# Patient Record
Sex: FEMALE | Race: WHITE | HISPANIC OR LATINO | Employment: UNEMPLOYED | URBAN - METROPOLITAN AREA
[De-identification: names, ages, dates, MRNs, and addresses within clinical notes are randomized per-mention and may not be internally consistent; named-entity substitution may affect disease eponyms.]

---

## 2017-03-20 ENCOUNTER — TRANSCRIBE ORDERS (OUTPATIENT)
Dept: ADMINISTRATIVE | Facility: HOSPITAL | Age: 52
End: 2017-03-20

## 2017-03-20 DIAGNOSIS — R19.00 PELVIC MASS IN FEMALE: Primary | ICD-10-CM

## 2017-03-28 ENCOUNTER — HOSPITAL ENCOUNTER (OUTPATIENT)
Dept: MRI IMAGING | Facility: CLINIC | Age: 52
Discharge: HOME | End: 2017-03-28
Payer: COMMERCIAL

## 2017-03-28 DIAGNOSIS — R19.00 PELVIC MASS IN FEMALE: ICD-10-CM

## 2017-04-06 ENCOUNTER — HOSPITAL ENCOUNTER (OUTPATIENT)
Dept: MRI IMAGING | Facility: HOSPITAL | Age: 52
Discharge: HOME | End: 2017-04-06
Payer: COMMERCIAL

## 2017-04-06 PROCEDURE — 72197 MRI PELVIS W/O & W/DYE: CPT

## 2017-04-06 PROCEDURE — A9585 GADOBUTROL INJECTION: HCPCS | Performed by: RADIOLOGY

## 2017-04-06 RX ADMIN — GADOBUTROL 12 ML: 604.72 INJECTION INTRAVENOUS at 10:49

## 2018-05-09 ENCOUNTER — TRANSCRIBE ORDERS (OUTPATIENT)
Dept: ADMINISTRATIVE | Facility: HOSPITAL | Age: 53
End: 2018-05-09

## 2018-05-09 DIAGNOSIS — Z78.0 MENOPAUSE: ICD-10-CM

## 2018-05-09 DIAGNOSIS — Z13.820 SCREENING FOR OSTEOPOROSIS: Primary | ICD-10-CM

## 2018-05-09 DIAGNOSIS — R92.8 ABNORMAL MAMMOGRAM: Primary | ICD-10-CM

## 2018-05-09 DIAGNOSIS — D25.9 UTERINE LEIOMYOMA, UNSPECIFIED LOCATION: Primary | ICD-10-CM

## 2018-05-09 DIAGNOSIS — R92.2 BREAST DENSITY: Primary | ICD-10-CM

## 2018-05-15 ENCOUNTER — HOSPITAL ENCOUNTER (OUTPATIENT)
Dept: BONE DENSITY | Facility: CLINIC | Age: 53
Discharge: HOME | End: 2018-05-15
Payer: COMMERCIAL

## 2018-05-15 DIAGNOSIS — Z78.0 MENOPAUSE: ICD-10-CM

## 2018-05-15 DIAGNOSIS — Z13.820 SCREENING FOR OSTEOPOROSIS: ICD-10-CM

## 2018-05-15 PROCEDURE — 77080 DXA BONE DENSITY AXIAL: CPT

## 2018-05-16 ENCOUNTER — HOSPITAL ENCOUNTER (OUTPATIENT)
Dept: MAMMOGRAPHY | Facility: CLINIC | Age: 53
Discharge: HOME | End: 2018-05-16
Payer: COMMERCIAL

## 2018-05-16 ENCOUNTER — HOSPITAL ENCOUNTER (OUTPATIENT)
Dept: ULTRASOUND IMAGING | Facility: HOSPITAL | Age: 53
Discharge: HOME | End: 2018-05-16
Payer: COMMERCIAL

## 2018-05-16 ENCOUNTER — HOSPITAL ENCOUNTER (OUTPATIENT)
Dept: ULTRASOUND IMAGING | Facility: CLINIC | Age: 53
Discharge: HOME | End: 2018-05-16
Payer: COMMERCIAL

## 2018-05-16 DIAGNOSIS — R92.8 ABNORMAL MAMMOGRAM: ICD-10-CM

## 2018-05-16 DIAGNOSIS — R92.2 BREAST DENSITY: ICD-10-CM

## 2018-05-16 DIAGNOSIS — D25.9 UTERINE LEIOMYOMA, UNSPECIFIED LOCATION: ICD-10-CM

## 2018-05-16 PROCEDURE — 76641 ULTRASOUND BREAST COMPLETE: CPT

## 2018-05-16 PROCEDURE — 76856 US EXAM PELVIC COMPLETE: CPT

## 2018-05-16 PROCEDURE — 76830 TRANSVAGINAL US NON-OB: CPT

## 2018-05-16 PROCEDURE — 77067 SCR MAMMO BI INCL CAD: CPT

## 2018-05-16 PROCEDURE — 76377 3D RENDER W/INTRP POSTPROCES: CPT

## 2018-08-07 ENCOUNTER — TRANSCRIBE ORDERS (OUTPATIENT)
Dept: ADMINISTRATIVE | Facility: HOSPITAL | Age: 53
End: 2018-08-07

## 2018-09-18 ENCOUNTER — TELEPHONE (OUTPATIENT)
Dept: BARIATRICS | Facility: CLINIC | Age: 53
End: 2018-09-18

## 2018-10-03 ENCOUNTER — OFFICE VISIT (OUTPATIENT)
Dept: BARIATRICS | Facility: CLINIC | Age: 53
End: 2018-10-03
Payer: COMMERCIAL

## 2018-10-03 VITALS
SYSTOLIC BLOOD PRESSURE: 132 MMHG | BODY MASS INDEX: 58.04 KG/M2 | HEIGHT: 57 IN | DIASTOLIC BLOOD PRESSURE: 80 MMHG | WEIGHT: 269 LBS | HEART RATE: 74 BPM | TEMPERATURE: 98.4 F | RESPIRATION RATE: 18 BRPM

## 2018-10-03 DIAGNOSIS — E66.01 MORBID OBESITY WITH BMI OF 50.0-59.9, ADULT (HCC): Primary | ICD-10-CM

## 2018-10-03 DIAGNOSIS — E55.9 VITAMIN D DEFICIENCY: ICD-10-CM

## 2018-10-03 DIAGNOSIS — I10 HYPERTENSION, UNSPECIFIED TYPE: ICD-10-CM

## 2018-10-03 PROBLEM — E66.9 OBESITY: Status: ACTIVE | Noted: 2018-10-03

## 2018-10-03 PROCEDURE — 99204 OFFICE O/P NEW MOD 45 MIN: CPT | Performed by: PHYSICIAN ASSISTANT

## 2018-10-03 RX ORDER — LISINOPRIL 10 MG/1
TABLET ORAL
COMMUNITY
Start: 2017-02-20

## 2018-10-03 RX ORDER — DIPHENOXYLATE HYDROCHLORIDE AND ATROPINE SULFATE 2.5; .025 MG/1; MG/1
1 TABLET ORAL
COMMUNITY

## 2018-10-03 NOTE — PROGRESS NOTES
Assessment/Plan:    Hypertension  Taking lisinopril  -should improve with weight loss, dietary, and lifestyle changes      Morbid obesity with BMI of 50 0-59 9, adult ([facility] )  -Discussed options of HealthyCORE-Intensive Lifestyle Intervention Program, Very Low Calorie Diet-VLCD, Conservative Program, Zuhair-En-Y Gastric Bypass and Vertical Sleeve Gastrectomy and the role of weight loss medications   -Initial weight loss goal of 5-10% weight loss for improved health  -Screening labs  Recommend checking lab coverage before having labs drawn  -A1c, CMP, Lipid, TSH and Vit D reviewed from 2/22/2018 all within acceptable limits except for elevated A1C and decreased Vitamin D--will repeat vit D  - STOP BANG-3/8  -Patient is interested in pursuing surgery seminar and if she decides against surgery will do Very Low Calorie Diet-VLCD     Monitor blood pressure and if at any point BP goes below 100/60 patyient to call us to alter BP meds or is she experiences lightheaded or dizziness  Follow up for vlcd start  Diagnoses and all orders for this visit:    Morbid obesity with BMI of 50 0-59 9, adult ([facility] )  -     Vitamin D 25 hydroxy; Future  -     Comprehensive metabolic panel; Future    Vitamin D deficiency  -     Vitamin D 25 hydroxy; Future    Hypertension, unspecified type  -     Comprehensive metabolic panel; Future    Other orders  -     lisinopril (ZESTRIL) 10 mg tablet; 1 tab(s)  -     Garlic 10 MG CAPS;   -     multivitamin (THERAGRAN) TABS; Take 1 tablet by mouth  -     Calcium-Magnesium 300-300 MG TABS; Take by mouth  -     Cyanocobalamin (B-12 PO); Take by mouth          Subjective:   Chief Complaint   Patient presents with    Consult     Pt here for MWM consult  BMI 53 7       Patient ID: Mateo Looney  is a 46 y o  female with excess weight/obesity here to pursue weight management      Past Medical History:   Diagnosis Date    Hypertension     No official diagnosis    Obesity HPI:  Obesity/Excess Weight:  Severity: Very Severe  Onset: For the last 27 years   Modifiers: Diet and Exercise  Contributing factors: Insufficient Physical Activity and portion sizes   Associated symptoms: comorbid conditions    Goals:175 lbs   Hydration:3-4 bottles of water per day  1 cup of hot tea with hibisco   Alcohol: rare    The following portions of the patient's history were reviewed and updated as appropriate: allergies, current medications, past family history, past medical history, past social history, past surgical history and problem list     Review of Systems   HENT: Negative for sore throat  Respiratory: Negative for cough and shortness of breath  Cardiovascular: Negative for chest pain and palpitations  Gastrointestinal: Negative for abdominal pain, constipation, diarrhea, nausea and vomiting  Denies GERD   Endocrine: Negative for cold intolerance and heat intolerance  Genitourinary: Negative for dysuria  Musculoskeletal: Negative for arthralgias and back pain  Skin: Negative for rash  Neurological: Negative for headaches  Psychiatric/Behavioral: Negative for suicidal ideas (denies HI)  Denies depression or anxiety       Objective:    /80 (BP Location: Right arm, Patient Position: Sitting, Cuff Size: Large)   Pulse 74   Temp 98 4 °F (36 9 °C) (Tympanic)   Resp 18   Ht 4' 9" (1 448 m)   Wt 122 kg (269 lb)   BMI 58 21 kg/m²     Physical Exam   Nursing note and vitals reviewed  Constitutional   General appearance: Abnormal   well developed and morbidly obese  Eyes No conjunctival pallor  Ears, Nose, Mouth, and Throat Oral mucosa moist    Pulmonary   Respiratory effort: No increased work of breathing or signs of respiratory distress  Auscultation of lungs: Clear to auscultation, equal breath sounds bilaterally, no wheezes, no rales, no rhonci  Cardiovascular   Auscultation of heart: Normal rate and rhythm, normal S1 and S2, without murmurs  Examination of extremities for edema and/or varicosities: +1 pitting edema bilaterally   Abdomen   Abdomen: Abnormal   The abdomen was obese  Bowel sounds were normal  The abdomen was soft and nontender     Musculoskeletal   Gait and station: Normal     Psychiatric   Orientation to person, place and time: Normal     Affect: appropriate

## 2018-10-03 NOTE — ASSESSMENT & PLAN NOTE
-Discussed options of HealthyCORE-Intensive Lifestyle Intervention Program, Very Low Calorie Diet-VLCD, Conservative Program, Zuhair-En-Y Gastric Bypass and Vertical Sleeve Gastrectomy and the role of weight loss medications   -Initial weight loss goal of 5-10% weight loss for improved health  -Screening labs  Recommend checking lab coverage before having labs drawn  -A1c, CMP, Lipid, TSH and Vit D reviewed from 2/22/2018 all within acceptable limits except for elevated A1C and decreased Vitamin D--will repeat vit D  - STOP BANG-3/8  -Patient is interested in pursuing surgery seminar and if she decides against surgery will do Very Low Calorie Diet-VLCD     Monitor blood pressure and if at any point BP goes below 100/60 patyient to call us to alter BP meds or is she experiences lightheaded or dizziness

## 2022-11-08 ENCOUNTER — OFFICE VISIT (OUTPATIENT)
Dept: OBGYN CLINIC | Facility: CLINIC | Age: 57
End: 2022-11-08

## 2022-11-08 VITALS
WEIGHT: 269.2 LBS | BODY MASS INDEX: 56.51 KG/M2 | DIASTOLIC BLOOD PRESSURE: 88 MMHG | SYSTOLIC BLOOD PRESSURE: 136 MMHG | HEIGHT: 58 IN

## 2022-11-08 DIAGNOSIS — Z12.11 ENCOUNTER FOR SCREENING COLONOSCOPY: ICD-10-CM

## 2022-11-08 DIAGNOSIS — R92.2 DENSE BREAST TISSUE: ICD-10-CM

## 2022-11-08 DIAGNOSIS — Z12.4 CERVICAL CANCER SCREENING: ICD-10-CM

## 2022-11-08 DIAGNOSIS — Z12.31 ENCOUNTER FOR SCREENING MAMMOGRAM FOR BREAST CANCER: ICD-10-CM

## 2022-11-08 DIAGNOSIS — Z01.419 ENCOUNTER FOR ANNUAL ROUTINE GYNECOLOGICAL EXAMINATION: Primary | ICD-10-CM

## 2022-11-08 PROBLEM — D12.6 TUBULAR ADENOMA OF COLON: Status: ACTIVE | Noted: 2017-11-28

## 2022-11-08 PROBLEM — E27.40 ADRENAL INSUFFICIENCY (HCC): Status: ACTIVE | Noted: 2018-02-15

## 2022-11-08 PROBLEM — E66.01 MORBIDLY OBESE (HCC): Status: ACTIVE | Noted: 2018-02-15

## 2022-11-08 PROBLEM — E78.2 MIXED HYPERLIPIDEMIA: Status: ACTIVE | Noted: 2017-11-28

## 2022-11-08 PROBLEM — I10 HTN (HYPERTENSION), BENIGN: Status: ACTIVE | Noted: 2017-11-28

## 2022-11-08 RX ORDER — FLUTICASONE PROPIONATE 50 MCG
1 SPRAY, SUSPENSION (ML) NASAL 2 TIMES DAILY
COMMUNITY
Start: 2022-11-03

## 2022-11-08 RX ORDER — LORATADINE 10 MG/1
10 TABLET ORAL DAILY
COMMUNITY
Start: 2022-11-03

## 2022-11-08 RX ORDER — NEOMYCIN SULFATE, POLYMYXIN B SULFATE AND DEXAMETHASONE 3.5; 10000; 1 MG/ML; [USP'U]/ML; MG/ML
SUSPENSION/ DROPS OPHTHALMIC
COMMUNITY
Start: 2022-11-03

## 2022-11-08 NOTE — ASSESSMENT & PLAN NOTE
Patient declines mammogram screening  Request ultrasound only  Reviewed recommendations for screening for breast cancer, ultrasound is not adequate alone  Pap/HPV collected    Encourage healthy diet, exercise, Calcium 1200mg per day and at least 800 iu Vitamin D daily

## 2022-11-08 NOTE — PATIENT INSTRUCTIONS

## 2022-11-08 NOTE — PROGRESS NOTES
Assessment/Plan:    Encounter for annual routine gynecological examination  Patient declines mammogram screening  Request ultrasound only  Reviewed recommendations for screening for breast cancer, ultrasound is not adequate alone  Pap/HPV collected    Encourage healthy diet, exercise, Calcium 1200mg per day and at least 800 iu Vitamin D daily  Diagnoses and all orders for this visit:    Encounter for annual routine gynecological examination    Cervical cancer screening  -     Liquid-based pap, screening    Encounter for screening mammogram for breast cancer  -     Mammo screening bilateral w 3d & cad; Future  -     US breast screening bilateral complete (ABUS); Future    Encounter for screening colonoscopy  -     Ambulatory Referral to Gastroenterology; Future    Dense breast tissue  -     US breast screening bilateral complete (ABUS); Future    Other orders  -     betamethasone valerate (VALISONE) 0 1 % cream; APPLY IN LEFT EAR TWICE A DAY AS DIRECTED if needed  -     fluticasone (FLONASE) 50 mcg/act nasal spray; 1 spray 2 (two) times a day  -     loratadine (CLARITIN) 10 mg tablet; Take 10 mg by mouth daily  -     neomycin-polymyxin-dexamethasone (MAXITROL) ophthalmic suspension; instill 5 drops IN LEFT EAR TWICE A DAY          Subjective:      Patient ID: Ericka Juarez is a 61 y o  female  Patient presents for a routine annual visit  Menarche- 12Y/O  Last Pap Smear- no record? Due today    Mammogram- 8/12/20  Overdue  Order placed   Colonoscopy-Per pt had about 6 years ago  Recall was 5 years  Referral placed  [id number]  Non smoker  Social drinker  Currently not sexually active  No family history of uterine, ovarian, cervical or breast cancer  No concerns/questions for today's visit  States has not had GYN care since 2018  Declines mammogram   Feels ultrasound is more accurate and more comfortable  NO bleeding since age 54ish  No hot flashes  Recent acne             The following portions of the patient's history were reviewed and updated as appropriate:   She  has a past medical history of Hypertension and Obesity  She   Patient Active Problem List    Diagnosis Date Noted   • Encounter for annual routine gynecological examination 11/08/2022   • Morbid obesity with BMI of 50 0-59 9, adult ([facility] ) 10/03/2018   • Adrenal insufficiency ([email or web address] ) 02/15/2018   • Morbidly obese ([email or web address] ) 02/15/2018   • HTN (hypertension), benign 11/28/2017   • Mixed hyperlipidemia 11/28/2017   • Tubular adenoma of colon 11/28/2017     She  has a past surgical history that includes No past surgeries  Her family history includes Diabetes in her mother; Hypertension in her father; Stroke in her father  She  reports that she has never smoked  She has never used smokeless tobacco  She reports current alcohol use  She reports that she does not use drugs  Current Outpatient Medications   Medication Sig Dispense Refill   • betamethasone valerate (VALISONE) 0 1 % cream APPLY IN LEFT EAR TWICE A DAY AS DIRECTED if needed     • Calcium-Magnesium 300-300 MG TABS Take by mouth     • Cyanocobalamin (B-12 PO) Take by mouth     • fluticasone (FLONASE) 50 mcg/act nasal spray 1 spray 2 (two) times a day     • Garlic 10 MG CAPS      • lisinopril (ZESTRIL) 10 mg tablet 1 tab(s)     • loratadine (CLARITIN) 10 mg tablet Take 10 mg by mouth daily     • multivitamin (THERAGRAN) TABS Take 1 tablet by mouth     • neomycin-polymyxin-dexamethasone (MAXITROL) ophthalmic suspension instill 5 drops IN LEFT EAR TWICE A DAY       No current facility-administered medications for this visit       Current Outpatient Medications on File Prior to Visit   Medication Sig   • betamethasone valerate (VALISONE) 0 1 % cream APPLY IN LEFT EAR TWICE A DAY AS DIRECTED if needed   • Calcium-Magnesium 300-300 MG TABS Take by mouth   • Cyanocobalamin (B-12 PO) Take by mouth   • fluticasone (FLONASE) 50 mcg/act nasal spray 1 spray 2 (two) times a day   • Garlic 10 MG CAPS    • lisinopril (ZESTRIL) 10 mg tablet 1 tab(s)   • loratadine (CLARITIN) 10 mg tablet Take 10 mg by mouth daily   • multivitamin (THERAGRAN) TABS Take 1 tablet by mouth   • neomycin-polymyxin-dexamethasone (MAXITROL) ophthalmic suspension instill 5 drops IN LEFT EAR TWICE A DAY     No current facility-administered medications on file prior to visit  She has No Known Allergies       Review of Systems   Constitutional: Negative for activity change, appetite change, chills, fatigue and fever  HENT: Negative for rhinorrhea, sneezing and sore throat  Eyes: Negative for visual disturbance  Respiratory: Negative for cough, shortness of breath and wheezing  Cardiovascular: Negative for chest pain, palpitations and leg swelling  Gastrointestinal: Negative for abdominal distention, constipation, diarrhea, nausea and vomiting  Genitourinary: Negative for difficulty urinating  Neurological: Negative for syncope and light-headedness  Objective:      /88 (BP Location: Left arm, Patient Position: Sitting, Cuff Size: Standard)   Ht 4' 10" (1 473 m)   Wt 122 kg (269 lb 3 2 oz)   BMI 56 26 kg/m²          Physical Exam  Constitutional:       General: She is not in acute distress  Appearance: She is well-developed  She is not diaphoretic  Chest:   Breasts: Breasts are symmetrical       Right: No inverted nipple, mass, nipple discharge, skin change or tenderness  Left: No inverted nipple, mass, nipple discharge, skin change or tenderness  Abdominal:      General: Bowel sounds are normal  There is no distension  Palpations: Abdomen is soft  There is no mass  Tenderness: There is no abdominal tenderness  There is no guarding or rebound  Genitourinary:     Labia:         Right: No rash, tenderness, lesion or injury  Left: No rash, tenderness, lesion or injury  Vagina: No vaginal discharge or bleeding        Cervix: No cervical motion tenderness, discharge or friability  Uterus: Not deviated, not enlarged, not fixed and not tender  Adnexa:         Right: No mass, tenderness or fullness  Left: No mass, tenderness or fullness  Comments: Urethral meatus- no lesions, non tender  Urethra non tender  Bladder non tender  Thin, atrophic vaginal mucosa  Skin:     General: Skin is warm and dry  Coloration: Skin is not pale  Findings: No erythema or rash

## 2022-11-10 LAB
HPV HR 12 DNA CVX QL NAA+PROBE: NEGATIVE
HPV16 DNA CVX QL NAA+PROBE: NEGATIVE
HPV18 DNA CVX QL NAA+PROBE: NEGATIVE

## 2022-11-12 LAB
LAB AP GYN PRIMARY INTERPRETATION: NORMAL
Lab: NORMAL

## 2022-11-16 ENCOUNTER — TELEPHONE (OUTPATIENT)
Dept: OBGYN CLINIC | Facility: CLINIC | Age: 57
End: 2022-11-16

## 2022-11-16 NOTE — TELEPHONE ENCOUNTER
----- Message from Priya Robles MD sent at 11/14/2022 12:59 PM EST -----  Please call Cecilia Coleman  Pap and HPV reviewed   Normal

## 2022-11-16 NOTE — TELEPHONE ENCOUNTER
Left detailed message on VM (OK per comm consent)  Office number provided with questions or concerns

## 2023-01-11 ENCOUNTER — PREP FOR PROCEDURE (OUTPATIENT)
Dept: GASTROENTEROLOGY | Facility: CLINIC | Age: 58
End: 2023-01-11

## 2023-01-11 ENCOUNTER — TELEPHONE (OUTPATIENT)
Dept: GASTROENTEROLOGY | Facility: CLINIC | Age: 58
End: 2023-01-11

## 2023-01-11 DIAGNOSIS — Z86.010 HISTORY OF COLON POLYPS: Primary | ICD-10-CM

## 2023-01-11 NOTE — TELEPHONE ENCOUNTER
Scheduled date of colonoscopy (as of today): 3/30/2023  Physician performing colonoscopy: Dr Herbie Hicks  Location of colonoscopy: [place]    Clearances: N/A

## 2023-01-12 ENCOUNTER — TELEPHONE (OUTPATIENT)
Dept: GASTROENTEROLOGY | Facility: CLINIC | Age: 58
End: 2023-01-12

## 2023-03-30 ENCOUNTER — HOSPITAL ENCOUNTER (OUTPATIENT)
Dept: GASTROENTEROLOGY | Facility: HOSPITAL | Age: 58
Setting detail: OUTPATIENT SURGERY
Discharge: HOME | End: 2023-03-30
Attending: INTERNAL MEDICINE

## 2023-03-30 ENCOUNTER — ANESTHESIA EVENT (OUTPATIENT)
Dept: GASTROENTEROLOGY | Facility: HOSPITAL | Age: 58
End: 2023-03-30

## 2023-03-30 ENCOUNTER — ANESTHESIA (OUTPATIENT)
Dept: GASTROENTEROLOGY | Facility: HOSPITAL | Age: 58
End: 2023-03-30

## 2023-03-30 VITALS
WEIGHT: 272.71 LBS | HEIGHT: 58 IN | OXYGEN SATURATION: 95 % | BODY MASS INDEX: 57.24 KG/M2 | TEMPERATURE: 97.4 F | RESPIRATION RATE: 16 BRPM | HEART RATE: 69 BPM | DIASTOLIC BLOOD PRESSURE: 65 MMHG | SYSTOLIC BLOOD PRESSURE: 113 MMHG

## 2023-03-30 DIAGNOSIS — Z86.010 HISTORY OF COLON POLYPS: ICD-10-CM

## 2023-03-30 RX ORDER — PROPOFOL 10 MG/ML
INJECTION, EMULSION INTRAVENOUS AS NEEDED
Status: DISCONTINUED | OUTPATIENT
Start: 2023-03-30 | End: 2023-03-30

## 2023-03-30 RX ORDER — SODIUM CHLORIDE, SODIUM LACTATE, POTASSIUM CHLORIDE, CALCIUM CHLORIDE 600; 310; 30; 20 MG/100ML; MG/100ML; MG/100ML; MG/100ML
INJECTION, SOLUTION INTRAVENOUS CONTINUOUS PRN
Status: DISCONTINUED | OUTPATIENT
Start: 2023-03-30 | End: 2023-03-30

## 2023-03-30 RX ORDER — LIDOCAINE HYDROCHLORIDE 20 MG/ML
INJECTION, SOLUTION EPIDURAL; INFILTRATION; INTRACAUDAL; PERINEURAL AS NEEDED
Status: DISCONTINUED | OUTPATIENT
Start: 2023-03-30 | End: 2023-03-30

## 2023-03-30 RX ADMIN — SODIUM CHLORIDE, SODIUM LACTATE, POTASSIUM CHLORIDE, AND CALCIUM CHLORIDE: .6; .31; .03; .02 INJECTION, SOLUTION INTRAVENOUS at 09:25

## 2023-03-30 RX ADMIN — PROPOFOL 20 MG: 10 INJECTION, EMULSION INTRAVENOUS at 09:40

## 2023-03-30 RX ADMIN — LIDOCAINE HYDROCHLORIDE 100 MG: 20 INJECTION, SOLUTION EPIDURAL; INFILTRATION; INTRACAUDAL at 09:34

## 2023-03-30 RX ADMIN — PROPOFOL 20 MG: 10 INJECTION, EMULSION INTRAVENOUS at 09:38

## 2023-03-30 RX ADMIN — PROPOFOL 130 MG: 10 INJECTION, EMULSION INTRAVENOUS at 09:34

## 2023-03-30 RX ADMIN — PROPOFOL 20 MG: 10 INJECTION, EMULSION INTRAVENOUS at 09:42

## 2023-03-30 RX ADMIN — PROPOFOL 30 MG: 10 INJECTION, EMULSION INTRAVENOUS at 09:36

## 2023-03-30 NOTE — ANESTHESIA PREPROCEDURE EVALUATION
Procedure:  COLONOSCOPY    Relevant Problems   CARDIO   (+) HTN (hypertension), benign   (+) Mixed hyperlipidemia      Endocrine   (+) Adrenal insufficiency (HCC)      Other   (+) Morbid obesity with BMI of 50 0-59 9, adult (HCC)   (+) Morbidly obese (HCC)        Physical Exam    Airway    Mallampati score: III  TM Distance: >3 FB  Neck ROM: full     Dental       Cardiovascular  Cardiovascular exam normal    Pulmonary  Pulmonary exam normal     Other Findings        Anesthesia Plan  ASA Score- 3     Anesthesia Type- IV sedation with anesthesia with ASA Monitors  Additional Monitors:   Airway Plan:           Plan Factors-Exercise tolerance (METS): >4 METS  Chart reviewed  EKG reviewed  Imaging results reviewed  Existing labs reviewed  Patient summary reviewed  Induction- intravenous  Postoperative Plan-     Informed Consent- Anesthetic plan and risks discussed with patient  I personally reviewed this patient with the CRNA  Discussed and agreed on the Anesthesia Plan with the CRNA  Priti Bustamante

## 2023-03-30 NOTE — ANESTHESIA POSTPROCEDURE EVALUATION
Post-Op Assessment Note    CV Status:  Stable  Pain Score: 0    Pain management: adequate     Mental Status:  Alert and awake   Hydration Status:  Euvolemic   PONV Controlled:  Controlled   Airway Patency:  Patent      Post Op Vitals Reviewed: Yes      Staff: CRNA         There were no known notable events for this encounter      /68 (03/30/23 0950)    Temp (!) 97 4 °F (36 3 °C) (03/30/23 0950)    Pulse 76 (03/30/23 0950)   Resp 16 (03/30/23 0950)    SpO2 94 % (03/30/23 0950)

## 2023-03-30 NOTE — H&P
"History and Physical -  [facility]  Terry Harris 60 y o  female MRN: [id number]      HPI: Terry Harris is a 60y o  year old female who presents for history of colon polyp      REVIEW OF SYSTEMS: Per the HPI, and otherwise unremarkable  Historical Information   Past Medical History:   Diagnosis Date   • Hypertension     No official diagnosis   • Obesity      Past Surgical History:   Procedure Laterality Date   • CARPAL TUNNEL RELEASE     • COLONOSCOPY     • CYST REMOVAL     • NO PAST SURGERIES       Social History   Social History     Substance and Sexual Activity   Alcohol Use Yes    Comment: occasional     Social History     Substance and Sexual Activity   Drug Use No     Social History     Tobacco Use   Smoking Status Never   Smokeless Tobacco Never     Family History   Problem Relation Age of Onset   • Diabetes Mother    • Stroke Father    • Hypertension Father    • Cancer Neg Hx    • Heart disease Neg Hx    • Thyroid disease Neg Hx    • Breast cancer Neg Hx    • Ovarian cancer Neg Hx    • Uterine cancer Neg Hx    • Cervical cancer Neg Hx        Meds/Allergies     (Not in a hospital admission)      No Known Allergies    Objective     Blood pressure (!) 174/79, pulse 87, temperature 97 8 °F (36 6 °C), temperature source Temporal, resp  rate 16, height 4' 10\" (1 473 m), weight 124 kg (272 lb 11 3 oz), SpO2 98 %  PHYSICAL EXAM    Gen: NAD  CV: RRR  CHEST: Clear  ABD: soft, NT/ND  EXT: no edema      ASSESSMENT/PLAN:  This is a 60y o  year old female here for colonoscopy, and she is stable and optimized for her procedure          "

## 2023-10-31 ENCOUNTER — APPOINTMENT (OUTPATIENT)
Dept: MRI IMAGING | Facility: HOSPITAL | Age: 58
End: 2023-10-31
Payer: COMMERCIAL

## 2023-10-31 ENCOUNTER — APPOINTMENT (EMERGENCY)
Dept: RADIOLOGY | Facility: HOSPITAL | Age: 58
End: 2023-10-31
Payer: COMMERCIAL

## 2023-10-31 ENCOUNTER — APPOINTMENT (EMERGENCY)
Dept: CT IMAGING | Facility: HOSPITAL | Age: 58
End: 2023-10-31
Payer: COMMERCIAL

## 2023-10-31 ENCOUNTER — HOSPITAL ENCOUNTER (OUTPATIENT)
Facility: HOSPITAL | Age: 58
Setting detail: OBSERVATION
Discharge: HOME | End: 2023-11-01
Attending: EMERGENCY MEDICINE | Admitting: STUDENT IN AN ORGANIZED HEALTH CARE EDUCATION/TRAINING PROGRAM
Payer: COMMERCIAL

## 2023-10-31 DIAGNOSIS — I63.9 CEREBROVASCULAR ACCIDENT (CVA), UNSPECIFIED MECHANISM (HCC): ICD-10-CM

## 2023-10-31 DIAGNOSIS — R20.0 FACIAL NUMBNESS: Primary | ICD-10-CM

## 2023-10-31 PROBLEM — E11.69 TYPE 2 DIABETES MELLITUS WITH OTHER SPECIFIED COMPLICATION (HCC): Status: ACTIVE | Noted: 2023-10-31

## 2023-10-31 PROBLEM — R29.90 STROKE-LIKE SYMPTOMS: Status: ACTIVE | Noted: 2023-10-31

## 2023-10-31 PROBLEM — I16.0 HYPERTENSIVE URGENCY: Status: ACTIVE | Noted: 2017-11-28

## 2023-10-31 PROBLEM — I16.1 HYPERTENSIVE EMERGENCY: Status: ACTIVE | Noted: 2017-11-28

## 2023-10-31 PROBLEM — R79.89 ABNORMAL CORTISOL LEVEL: Status: ACTIVE | Noted: 2018-02-15

## 2023-10-31 PROBLEM — Z91.148 NONADHERENCE TO MEDICATION: Status: ACTIVE | Noted: 2023-10-31

## 2023-10-31 LAB
2HR DELTA HS TROPONIN: 2 NG/L
4HR DELTA HS TROPONIN: 1 NG/L
ANION GAP SERPL CALCULATED.3IONS-SCNC: 7 MMOL/L
APTT PPP: 29 SECONDS (ref 23–37)
ATRIAL RATE: 86 BPM
BUN SERPL-MCNC: 14 MG/DL (ref 5–25)
CALCIUM SERPL-MCNC: 9.1 MG/DL (ref 8.4–10.2)
CARDIAC TROPONIN I PNL SERPL HS: 4 NG/L
CARDIAC TROPONIN I PNL SERPL HS: 5 NG/L
CARDIAC TROPONIN I PNL SERPL HS: 6 NG/L
CHLORIDE SERPL-SCNC: 103 MMOL/L (ref 96–108)
CO2 SERPL-SCNC: 27 MMOL/L (ref 21–32)
CORTIS AM PEAK SERPL-MCNC: 20.4 UG/DL (ref 6.7–22.6)
CREAT SERPL-MCNC: 0.85 MG/DL (ref 0.6–1.3)
ERYTHROCYTE [DISTWIDTH] IN BLOOD BY AUTOMATED COUNT: 13.5 % (ref 11.6–15.1)
FLUAV RNA RESP QL NAA+PROBE: NEGATIVE
FLUBV RNA RESP QL NAA+PROBE: NEGATIVE
GFR SERPL CREATININE-BSD FRML MDRD: 75 ML/MIN/1.73SQ M
GLUCOSE SERPL-MCNC: 103 MG/DL (ref 65–140)
GLUCOSE SERPL-MCNC: 111 MG/DL (ref 65–140)
GLUCOSE SERPL-MCNC: 195 MG/DL (ref 65–140)
GLUCOSE SERPL-MCNC: 206 MG/DL (ref 65–140)
GLUCOSE SERPL-MCNC: 91 MG/DL (ref 65–140)
HCT VFR BLD AUTO: 43.1 % (ref 34.8–46.1)
HGB BLD-MCNC: 14.2 G/DL (ref 11.5–15.4)
INR PPP: 1.01 (ref 0.84–1.19)
MCH RBC QN AUTO: 30.3 PG (ref 26.8–34.3)
MCHC RBC AUTO-ENTMCNC: 32.9 G/DL (ref 31.4–37.4)
MCV RBC AUTO: 92 FL (ref 82–98)
P AXIS: 67 DEGREES
PLATELET # BLD AUTO: 257 THOUSANDS/UL (ref 149–390)
PMV BLD AUTO: 9.8 FL (ref 8.9–12.7)
POTASSIUM SERPL-SCNC: 3.5 MMOL/L (ref 3.5–5.3)
PR INTERVAL: 146 MS
PROTHROMBIN TIME: 13.9 SECONDS (ref 11.6–14.5)
QRS AXIS: 73 DEGREES
QRSD INTERVAL: 82 MS
QT INTERVAL: 364 MS
QTC INTERVAL: 435 MS
RBC # BLD AUTO: 4.69 MILLION/UL (ref 3.81–5.12)
RSV RNA RESP QL NAA+PROBE: NEGATIVE
SARS-COV-2 RNA RESP QL NAA+PROBE: NEGATIVE
SODIUM SERPL-SCNC: 137 MMOL/L (ref 135–147)
T WAVE AXIS: 52 DEGREES
VENTRICULAR RATE: 86 BPM
WBC # BLD AUTO: 7.56 THOUSAND/UL (ref 4.31–10.16)

## 2023-10-31 PROCEDURE — 82533 TOTAL CORTISOL: CPT | Performed by: PHYSICIAN ASSISTANT

## 2023-10-31 PROCEDURE — 85610 PROTHROMBIN TIME: CPT | Performed by: EMERGENCY MEDICINE

## 2023-10-31 PROCEDURE — 82948 REAGENT STRIP/BLOOD GLUCOSE: CPT

## 2023-10-31 PROCEDURE — 70496 CT ANGIOGRAPHY HEAD: CPT

## 2023-10-31 PROCEDURE — 80048 BASIC METABOLIC PNL TOTAL CA: CPT | Performed by: EMERGENCY MEDICINE

## 2023-10-31 PROCEDURE — 36415 COLL VENOUS BLD VENIPUNCTURE: CPT | Performed by: EMERGENCY MEDICINE

## 2023-10-31 PROCEDURE — 70551 MRI BRAIN STEM W/O DYE: CPT

## 2023-10-31 PROCEDURE — 85730 THROMBOPLASTIN TIME PARTIAL: CPT | Performed by: EMERGENCY MEDICINE

## 2023-10-31 PROCEDURE — 84484 ASSAY OF TROPONIN QUANT: CPT | Performed by: EMERGENCY MEDICINE

## 2023-10-31 PROCEDURE — 99285 EMERGENCY DEPT VISIT HI MDM: CPT | Performed by: EMERGENCY MEDICINE

## 2023-10-31 PROCEDURE — 93005 ELECTROCARDIOGRAM TRACING: CPT

## 2023-10-31 PROCEDURE — 0241U HB NFCT DS VIR RESP RNA 4 TRGT: CPT | Performed by: EMERGENCY MEDICINE

## 2023-10-31 PROCEDURE — 99291 CRITICAL CARE FIRST HOUR: CPT | Performed by: PHYSICAL MEDICINE & REHABILITATION

## 2023-10-31 PROCEDURE — 99223 1ST HOSP IP/OBS HIGH 75: CPT | Performed by: STUDENT IN AN ORGANIZED HEALTH CARE EDUCATION/TRAINING PROGRAM

## 2023-10-31 PROCEDURE — 85027 COMPLETE CBC AUTOMATED: CPT | Performed by: EMERGENCY MEDICINE

## 2023-10-31 PROCEDURE — 84484 ASSAY OF TROPONIN QUANT: CPT | Performed by: PHYSICIAN ASSISTANT

## 2023-10-31 PROCEDURE — 93010 ELECTROCARDIOGRAM REPORT: CPT | Performed by: INTERNAL MEDICINE

## 2023-10-31 PROCEDURE — 71045 X-RAY EXAM CHEST 1 VIEW: CPT

## 2023-10-31 PROCEDURE — 99285 EMERGENCY DEPT VISIT HI MDM: CPT

## 2023-10-31 PROCEDURE — 99291 CRITICAL CARE FIRST HOUR: CPT | Performed by: PSYCHIATRY & NEUROLOGY

## 2023-10-31 PROCEDURE — 70498 CT ANGIOGRAPHY NECK: CPT

## 2023-10-31 RX ORDER — MAGNESIUM HYDROXIDE/ALUMINUM HYDROXICE/SIMETHICONE 120; 1200; 1200 MG/30ML; MG/30ML; MG/30ML
30 SUSPENSION ORAL EVERY 6 HOURS PRN
Status: DISCONTINUED | OUTPATIENT
Start: 2023-10-31 | End: 2023-11-01 | Stop reason: HOSPADM

## 2023-10-31 RX ORDER — ASPIRIN 81 MG/1
81 TABLET, CHEWABLE ORAL DAILY
Status: DISCONTINUED | OUTPATIENT
Start: 2023-11-01 | End: 2023-11-01 | Stop reason: HOSPADM

## 2023-10-31 RX ORDER — CLOPIDOGREL BISULFATE 75 MG/1
300 TABLET ORAL ONCE
Status: COMPLETED | OUTPATIENT
Start: 2023-10-31 | End: 2023-10-31

## 2023-10-31 RX ORDER — LORAZEPAM 2 MG/ML
1 INJECTION INTRAMUSCULAR ONCE AS NEEDED
Status: COMPLETED | OUTPATIENT
Start: 2023-10-31 | End: 2023-10-31

## 2023-10-31 RX ORDER — INSULIN LISPRO 100 [IU]/ML
2-12 INJECTION, SOLUTION INTRAVENOUS; SUBCUTANEOUS
Status: DISCONTINUED | OUTPATIENT
Start: 2023-10-31 | End: 2023-11-01 | Stop reason: HOSPADM

## 2023-10-31 RX ORDER — ATORVASTATIN CALCIUM 40 MG/1
40 TABLET, FILM COATED ORAL EVERY EVENING
Status: DISCONTINUED | OUTPATIENT
Start: 2023-10-31 | End: 2023-11-01 | Stop reason: HOSPADM

## 2023-10-31 RX ORDER — POLYETHYLENE GLYCOL 3350 17 G/17G
17 POWDER, FOR SOLUTION ORAL DAILY PRN
Status: DISCONTINUED | OUTPATIENT
Start: 2023-10-31 | End: 2023-11-01 | Stop reason: HOSPADM

## 2023-10-31 RX ORDER — ONDANSETRON 2 MG/ML
4 INJECTION INTRAMUSCULAR; INTRAVENOUS EVERY 6 HOURS PRN
Status: DISCONTINUED | OUTPATIENT
Start: 2023-10-31 | End: 2023-11-01 | Stop reason: HOSPADM

## 2023-10-31 RX ORDER — ENOXAPARIN SODIUM 100 MG/ML
60 INJECTION SUBCUTANEOUS EVERY 12 HOURS SCHEDULED
Status: DISCONTINUED | OUTPATIENT
Start: 2023-10-31 | End: 2023-11-01 | Stop reason: HOSPADM

## 2023-10-31 RX ORDER — ASPIRIN 81 MG/1
324 TABLET, CHEWABLE ORAL ONCE
Status: COMPLETED | OUTPATIENT
Start: 2023-10-31 | End: 2023-10-31

## 2023-10-31 RX ORDER — CLOPIDOGREL BISULFATE 75 MG/1
75 TABLET ORAL DAILY
Status: DISCONTINUED | OUTPATIENT
Start: 2023-11-01 | End: 2023-11-01 | Stop reason: HOSPADM

## 2023-10-31 RX ORDER — ACETAMINOPHEN 325 MG/1
650 TABLET ORAL EVERY 6 HOURS PRN
Status: DISCONTINUED | OUTPATIENT
Start: 2023-10-31 | End: 2023-11-01 | Stop reason: HOSPADM

## 2023-10-31 RX ADMIN — CLOPIDOGREL BISULFATE 300 MG: 75 TABLET ORAL at 09:00

## 2023-10-31 RX ADMIN — ASPIRIN 324 MG: 81 TABLET, CHEWABLE ORAL at 09:00

## 2023-10-31 RX ADMIN — IOHEXOL 100 ML: 350 INJECTION, SOLUTION INTRAVENOUS at 08:23

## 2023-10-31 RX ADMIN — ENOXAPARIN SODIUM 60 MG: 60 INJECTION SUBCUTANEOUS at 20:30

## 2023-10-31 RX ADMIN — LORAZEPAM 1 MG: 2 INJECTION INTRAMUSCULAR; INTRAVENOUS at 11:23

## 2023-10-31 NOTE — ASSESSMENT & PLAN NOTE
Lab Results   Component Value Date    HGBA1C 6.5 (H) 11/01/2023       Recent Labs     10/31/23  1100 10/31/23  1619 10/31/23  2016 11/01/23  0807   POCGLU 111 91 103 120       Blood Sugar Average: Last 72 hrs:  (P) 124      - Medical management per primary team

## 2023-10-31 NOTE — SPEECH THERAPY NOTE
Speech Language/Pathology Screen    Patient received alert in bed. Patient took consecutive sips of thin liquids (water) without s/s of aspiration or distress. Patient denies any difficulties, globus sensation. Patient reported that swallow feels typical. Patient reported that previous symptoms (facial weakness) have resolved. Formal evaluation not warranted at this time. Please re-order should patient status change or concerns arise.

## 2023-10-31 NOTE — ASSESSMENT & PLAN NOTE
Presents c/o left facial numbness which began last night, appeared resolved upon awaking this morning but shortly thereafter noticed return of left facial numbness with associated left upper and lower extremity numbness and weakness and facial droop. [address] (10/31): No acute infarction or other intracranial abnormality. Low-lying cerebellar tonsils without meeting criteria for Chiari I malformation   CTA head/neck (10/31): limited due to contrast timing; no large vessel intracranial occlusion or hemodynamically significant stenosis. No extracranial carotid stenosis.   The cervical vertebral arteries are patent    Stroke pathway initiated,  No tPA/TNK given due to initial onset >4.5 hrs ago, NIHSS 2  Neuro consult  PT, OT, SLP consults   Neurochecks per protocol  Telemetry monitor   s/p aspirin/plavix load, start statin  Check A1c, lipid panel  Stat CTH for any acute neuro changes  Lovenox for DVT prophylaxis  MRI, echo pending

## 2023-10-31 NOTE — ASSESSMENT & PLAN NOTE
Body mass index is 59.44 kg/m².     Recommend incorporating a more whole foods plant-predominant diet along with decreasing consumption of red meats and processed foods  Per AHA guidelines, recommend moderate-vigorous intensity exercise for 30 minutes a day for 5 days a week or a total of 150 min/week

## 2023-10-31 NOTE — ASSESSMENT & PLAN NOTE
77-year-old female with diabetes, hypertension, obesity, who presented with strokelike symptoms. Patient reported left facial numbness and left sided weakness/gait instability. Stroke alert initiated in the ED. BP on presentation 226/135. NIHSS 2-left lower extremity drift and decreased sensation in left face/left lower extremity. Patient was deemed not a TNK candidate due to not being in the appropriate time window and resolving symptoms. MRI brain demonstrated R thalamic infarct. Suspect most likely small vessel etiology in th setting of uncontrolled vascular risk factors. Plan:  - Stroke pathway  Echo  S/p aspirin 324 mg and Plavix 300 mg x 1, continue aspirin 81 mg and Plavix 75 mg x 21 days then transition to aspirin monotherapy  Atorvastatin 40 mg  Goal normontension and avoid hypotension. Modification of vascular risk factors per primary team  Continue telemetry  PT/OT/ST  Frequent neuro checks. Continue to monitor and notify neurology with any changes. - Medical management and supportive care per primary team. Correction of any metabolic or infectious disturbances. - If echo unremarkable, then no further inpatient neurology recommendations. Please call with any questions. Results:  - CT head:  No acute infarction or other intracranial abnormality. Low-lying cerebellar tonsils without meeting criteria for Chiari I malformation.  - CTA head and neck:  1. CTA head: Negative for large vessel intracranial occlusion or hemodynamically significant stenosis. 2. CTA neck:  No extracranial carotid stenosis. The cervical vertebral arteries are patent.   - MRI brain: Subcentimeter subtle focus of abnormal signal within the right anterior thalamus on series 3 image 12 of diffusion weighted imaging described in detail above suspicious for small early lacunar infarction.  - , A1c 6.5

## 2023-10-31 NOTE — ED PROVIDER NOTES
History  Chief Complaint   Patient presents with   • Facial Numbness     Started with left sided facial numbness last night which now has progressed to entire face and left arm and leg weakness and trouble walking, pt non compliant with htn meds      Patient is a 65-year-old female past medical history of hypertension, hyperlipidemia, diabetes presenting with left-sided numbness/weakness, dizziness. Patient states that she began having left-sided facial numbness last night before she went to bed and states that when she woke up at 5 AM it was gone however returned at 7 AM roughly 1 hour ago and notes left-sided arm and leg numbness and weakness at that time as well. Also states that she is noting numbness to the right cheek in the same timeframe. States that it has been constant since then. Denies any headaches, neck pain, head or neck injuries, nausea or vomiting, chest pain, shortness of breath, fevers, vision changes, rashes, dysuria. Has been noncompliant with her lisinopril for years and states she only takes garlic. Also states that she has been feeling off balance but denies vertigo in the same timeframe. Prior to Admission Medications   Prescriptions Last Dose Informant Patient Reported? Taking?    Calcium-Magnesium 300-300 MG TABS  Self Yes No   Sig: Take by mouth   Cyanocobalamin (B-12 PO)  Self Yes No   Sig: Take by mouth   Garlic 10 MG CAPS   Yes No   betamethasone valerate (VALISONE) 0.1 % cream   Yes No   Sig: APPLY IN LEFT EAR TWICE A DAY AS DIRECTED if needed   fluticasone (FLONASE) 50 mcg/act nasal spray   Yes No   Si spray 2 (two) times a day   lisinopril (ZESTRIL) 10 mg tablet   Yes No   Si tab(s)   loratadine (CLARITIN) 10 mg tablet   Yes No   Sig: Take 10 mg by mouth daily   multivitamin (THERAGRAN) TABS   Yes No   Sig: Take 1 tablet by mouth   neomycin-polymyxin-dexamethasone (MAXITROL) ophthalmic suspension   Yes No   Sig: instill 5 drops IN LEFT EAR TWICE A DAY Facility-Administered Medications: None       Past Medical History:   Diagnosis Date   • Hypertension     No official diagnosis   • Obesity        Past Surgical History:   Procedure Laterality Date   • CARPAL TUNNEL RELEASE     • COLONOSCOPY     • CYST REMOVAL     • NO PAST SURGERIES         Family History   Problem Relation Age of Onset   • Diabetes Mother    • Stroke Father    • Hypertension Father    • Cancer Neg Hx    • Heart disease Neg Hx    • Thyroid disease Neg Hx    • Breast cancer Neg Hx    • Ovarian cancer Neg Hx    • Uterine cancer Neg Hx    • Cervical cancer Neg Hx      I have reviewed and agree with the history as documented. E-Cigarette/Vaping   • E-Cigarette Use Never User      E-Cigarette/Vaping Substances   • Nicotine No    • THC No    • CBD No    • Flavoring No    • Other No    • Unknown No      Social History     Tobacco Use   • Smoking status: Never   • Smokeless tobacco: Never   Vaping Use   • Vaping Use: Never used   Substance Use Topics   • Alcohol use: Yes     Comment: occasional   • Drug use: No       Review of Systems   All other systems reviewed and are negative. Physical Exam  Physical Exam  Vitals reviewed. Constitutional:       General: She is not in acute distress. Appearance: Normal appearance. She is not ill-appearing. HENT:      Mouth/Throat:      Mouth: Mucous membranes are moist.   Eyes:      Extraocular Movements: Extraocular movements intact. Conjunctiva/sclera: Conjunctivae normal.      Pupils: Pupils are equal, round, and reactive to light. Cardiovascular:      Rate and Rhythm: Normal rate and regular rhythm. Pulses: Normal pulses. Heart sounds: Normal heart sounds. Pulmonary:      Effort: Pulmonary effort is normal.      Breath sounds: Normal breath sounds. Abdominal:      General: Abdomen is flat. Palpations: Abdomen is soft. Tenderness: There is no abdominal tenderness. Musculoskeletal:         General: No swelling.  Normal range of motion. Cervical back: Neck supple. Skin:     General: Skin is warm and dry. Neurological:      General: No focal deficit present. Mental Status: She is alert. Cranial Nerves: Cranial nerve deficit present. Sensory: Sensory deficit present. Motor: Weakness present.       Coordination: Coordination normal.      Comments: Left-sided facial numbness including the forehead no facial droop, left arm and leg weakness 4 out of 5 as compared to 5 out of 5 on the right   Psychiatric:         Mood and Affect: Mood normal.         Vital Signs  ED Triage Vitals [10/31/23 0802]   Temperature Pulse Respirations Blood Pressure SpO2   98.3 °F (36.8 °C) 94 20 (!) 226/135 96 %      Temp src Heart Rate Source Patient Position - Orthostatic VS BP Location FiO2 (%)   -- -- -- -- --      Pain Score       --           Vitals:    10/31/23 0802   BP: (!) 226/135   Pulse: 94         Visual Acuity      ED Medications  Medications - No data to display    Diagnostic Studies  Results Reviewed       Procedure Component Value Units Date/Time    Fingerstick Glucose (POCT) [[id number]]  (Abnormal) Collected: 10/31/23 0807    Lab Status: Final result Updated: 10/31/23 0808     POC Glucose 195 mg/dl     Basic metabolic panel [[id number]]     Lab Status: No result Specimen: Blood     CBC and Platelet [[id number]]     Lab Status: No result Specimen: Blood     Protime-INR [[id number]]     Lab Status: No result Specimen: Blood     APTT [[id number]]     Lab Status: No result Specimen: Blood     HS Troponin 0hr (reflex protocol) [[id number]]     Lab Status: No result Specimen: Blood     FLU/RSV/COVID - if FLU/RSV clinically relevant [[id number]]     Lab Status: No result Specimen: Nares from Nose                    CT stroke alert brain    (Results Pending)   CTA stroke alert (head/neck)    (Results Pending)              Procedures  ECG 12 Lead Documentation Only    Date/Time: 10/31/2023 8:15 AM    Performed by: Dell Hernandez DO Daniel  Authorized by: Kem Nuñez DO    Patient location:  ED  Previous ECG:     Previous ECG:  Unavailable  Interpretation:     Interpretation: normal    Quality:     Tracing quality:  Limited by artifact  Rate:     ECG rate assessment: normal    Rhythm:     Rhythm: sinus rhythm    Ectopy:     Ectopy: none    QRS:     QRS axis:  Normal    QRS intervals:  Normal  Conduction:     Conduction: normal    ST segments:     ST segments:  Normal  T waves:     T waves: non-specific             ED Course  ED Course as of 10/31/23 1327   Tue Oct 31, 2023   0847 Symptoms are waxing and waning, neurology recommending no TNK, aspirin and Plavix loaded admission. Stroke Assessment       Row Name 10/31/23 0808             NIH Stroke Scale    Interval Baseline      Level of Consciousness (1a.) 0      LOC Questions (1b.) 0      LOC Commands (1c.) 0      Best Gaze (2.) 0      Visual (3.) 0      Facial Palsy (4.) 0      Motor Arm, Left (5a.) 1      Motor Arm, Right (5b.) 0      Motor Leg, Left (6a.) 1      Motor Leg, Right (6b.) 0      Limb Ataxia (7.) 0      Sensory (8.) 1      Best Language (9.) 0      Dysarthria (10.) 0      Extinction and Inattention (11.) (Formerly Neglect) 0      Total 3                                SBIRT 20yo+      Flowsheet Row Most Recent Value   Initial Alcohol Screen: US AUDIT-C     1. How often do you have a drink containing alcohol? 0 Filed at: 10/31/2023 0803   2. How many drinks containing alcohol do you have on a typical day you are drinking? 0 Filed at: 10/31/2023 0803   3a. Male UNDER 65: How often do you have five or more drinks on one occasion? 0 Filed at: 10/31/2023 0803   3b. FEMALE Any Age, or MALE 65+: How often do you have 4 or more drinks on one occassion? 0 Filed at: 10/31/2023 0051   Audit-C Score 0 Filed at: 10/31/2023 1890   DEDE: How many times in the past year have you. ..     Used an illegal drug or used a prescription medication for non-medical reasons? Never Filed at: 10/31/2023 8207                      Medical Decision Making  Patient is a 79-year-old female past medical history of hypertension, hyperlipidemia presenting with facial numbness, feeling off balance. Patient is well-appearing at bedside with stable vitals and in no acute distress. She has left-sided facial numbness as compared to the right however including the forehead and does appear to have 4 out of 5 muscle strength in the left arm and leg as compared to the right but able to lift both off bed. No other gross abnormalities on neurologic exam.  Patient complaining of right cheek numbness and left facial numbness does include forehead, less likely for CVA however due to time of onset, patient risk factors and left arm and leg weakness have called stroke alert and will discuss with neurology, patient NIH of 3. Patient initially hypertensive however improved without intervention. Amount and/or Complexity of Data Reviewed  Labs: ordered. Radiology: ordered. Disposition  Final diagnoses:   Facial numbness     Time reflects when diagnosis was documented in both MDM as applicable and the Disposition within this note       Time User Action Codes Description Comment    10/31/2023  8:07 AM Gerard Funes Add [R20.0] Facial numbness           ED Disposition       None          Follow-up Information    None         Patient's Medications   Discharge Prescriptions    No medications on file       No discharge procedures on file.     PDMP Review       None            ED Provider  Electronically Signed by             Lulu Mackey DO  10/31/23 7366

## 2023-10-31 NOTE — CONSULTS
PHYSICAL MEDICINE AND REHABILITATION CONSULT NOTE  Jana Herring 60 y.o. female MRN: [id number]  Unit/Bed#: -01 Encounter: [id number]    Requested by (Physician/Service): Filomena Sumner MD  Reason for Consultation:  Assessment of rehabilitation needs  Reason for Hospitalization:  Numbness [R20.0]  Dizziness [R42]  Facial numbness [R20.0]      History of Present Illness:  Jana Herring is a 60 y.o. female who  has a past medical history of Diabetes type 2, controlled ([address]), Hypertension, and Obesity. who presented to the [address] [place] [address] with left-sided facial numbness, left arm numbness and gait instability. She presented to the hospital as a stroke alert and symptoms were better upon arrival to the ED. CT head reported negative for acute abnormality. Patient was not a TNK candidate. She was evaluated by neurology and admitted under observation for TIA/stroke pathway. He denies any weakness. Does have some minimal numbness of the left lower extremity. Denies any speech difficulties. PM&R consulted for rehabilitation recommendations. Restrictions include:  none    Hospital Complications/Comorbidities:   Complications: As above  Comorbidities: As above    Morbid Obesity (BMI > 40) No     Last Weight Last BMI   Wt Readings from Last 1 Encounters:   10/31/23 129 kg (284 lb 6.3 oz)    Body mass index is 55.54 kg/m². Functional History:     Prior to Admission:     Functional Status: Patient was independent with mobility/ambulation, transfers, ADL's, IADL's.      Present:  Physical Therapy Occupational Therapy Speech Therapy   Evaluation is pending Evaluation is pending Denies any speech or swallowing difficulties     Past Medical History:   Past Surgical History:   Family History:     Past Medical History:   Diagnosis Date    Diabetes type 2, controlled ([address])     Hypertension     No official diagnosis    Obesity     Past Surgical History:   Procedure Laterality Date CARPAL TUNNEL RELEASE      COLONOSCOPY      CYST REMOVAL      NO PAST SURGERIES       Family History   Problem Relation Age of Onset    Diabetes Mother     Stroke Father     Hypertension Father     Cancer Neg Hx     Heart disease Neg Hx     Thyroid disease Neg Hx     Breast cancer Neg Hx     Ovarian cancer Neg Hx     Uterine cancer Neg Hx     Cervical cancer Neg Hx      - No family history of neurologic diseases.       Medications:    Current Facility-Administered Medications:     acetaminophen (TYLENOL) tablet 650 mg, 650 mg, Oral, Q6H PRN, Yarely Valiente PA-C    aluminum-magnesium hydroxide-simethicone (MAALOX) oral suspension 30 mL, 30 mL, Oral, Q6H PRN, Yarely Valiente PA-C    [START ON 11/1/2023] aspirin chewable tablet 81 mg, 81 mg, Oral, Daily, Yarely Valiente PA-C    atorvastatin (LIPITOR) tablet 40 mg, 40 mg, Oral, QPM, Yarely Valiente PA-C    enoxaparin (LOVENOX) subcutaneous injection 60 mg, 60 mg, Subcutaneous, Q12H GISELE, Yarely Valiente PA-C    insulin lispro (HumaLOG) 100 units/mL subcutaneous injection 2-12 Units, 2-12 Units, Subcutaneous, TID AC **AND** Fingerstick Glucose (POCT), , , 4x Daily AC and at bedtime, Yarely Valiente PA-C    insulin lispro (HumaLOG) 100 units/mL subcutaneous injection 2-12 Units, 2-12 Units, Subcutaneous, HS, Yarely Valiente PA-C    ondansetron (ZOFRAN) injection 4 mg, 4 mg, Intravenous, Q6H PRN, Yarely Valiente PA-C    polyethylene glycol (MIRALAX) packet 17 g, 17 g, Oral, Daily PRN, Christa Asencio PA-C    Allergies:   No Known Allergies     Social History:    Social History     Socioeconomic History    Marital status: Unknown     Spouse name: None    Number of children: None    Years of education: None    Highest education level: None   Occupational History    None   Tobacco Use    Smoking status: Never    Smokeless tobacco: Never   Vaping Use    Vaping Use: Never used   Substance and Sexual Activity    Alcohol use: Yes     Comment: occasional    Drug use: No    Sexual activity: Not Currently   Other Topics Concern    None   Social History Narrative    None     Social Determinants of Health     Financial Resource Strain: Not on file   Food Insecurity: Not on file   Transportation Needs: Not on file   Physical Activity: Not on file   Stress: Not on file   Social Connections: Not on file   Intimate Partner Violence: Not on file   Housing Stability: Not on file        Wallace More lives in a two-level house with 5 steps to enter, 8+6 steps to [employer] Stairs to enter with rails. Bed/bath upstairs. Review of Systems: A 10-point review of systems was performed. Negative except as listed above. Physical Exam:  Vital Signs:      Temp:  [98.3 °F (36.8 °C)-99 °F (37.2 °C)] 99 °F (37.2 °C)  HR:  [73-94] 77  Resp:  [17-26] 21  BP: (130-226)/() 149/80   Intake/Output Summary (Last 24 hours) at 10/31/2023 1550  Last data filed at 10/31/2023 1201  Gross per 24 hour   Intake 200 ml   Output --   Net 200 ml        Laboratory:      Lab Results   Component Value Date    HGB 14.2 10/31/2023    HCT 43.1 10/31/2023    WBC 7.56 10/31/2023     Lab Results   Component Value Date    BUN 14 10/31/2023    K 3.5 10/31/2023     10/31/2023    CREATININE 0.85 10/31/2023     Lab Results   Component Value Date    PROTIME 13.9 10/31/2023    INR 1.01 10/31/2023        General: alert, no apparent distress, cooperative, and comfortable  Head: Normal, normocephalic, atraumatic. Eye: Normal external eye, conjunctiva, lids   Ears: Normal external ears  Nose: Normal external nose, mucus membranes. Pharynx: Dental Hygiene adequate. Normal buccal mucosa. Normal pharynx. Neck / Thyroid: Supple, no masses, nodes, nodules or enlargement. Abdomen: soft, nontender, nondistended, no masses or organomegaly  Skin/Extremity: no rashes, no erythema, no peripheral edema   Neurologic: Cranial nerves II to XII intact except decreased left facial sensation. Normal tone and bulk on motor examination. Slight drift noted in the left lower extremity when held antigravity on motor examination. Sensation to pinprick in the left lower extremity. No tremor noted finger-to-nose is intact bilaterally. Reflexes symmetric bilaterally and age-appropriate. Plantars are downgoing bilaterally. Psych: Appropriate affect, alert and oriented to person, place and time   Musculoskeletal - Strength:   Right  Left  Site  Right  Left  Site    5 5  S Ab: Shoulder Abductors  5  5  HF: Hip Flexors    5 5  EF: Elbow Flexors  5 5 KF: Knee Flexors    5  5  EE: Elbow Extensors  5  5  KE: Knee Extensors    5  5  WE: Wrist Extensors  5  5  DR: Dorsi Flexors    5  5  FF: Finger Flexors  5  5  PF: Plantar Flexors    5  5  HI: Hand Intrinsics  5  5  EHL: Extensor Hallucis Longus       Imaging: Reviewed  MRI brain wo contrast    Result Date: 10/31/2023  Impression: Subcentimeter subtle focus of abnormal signal within the right anterior thalamus on series 3 image 12 of diffusion weighted imaging described in detail above suspicious for small early lacunar infarction. This examination was marked "immediate notification" in Epic in order to begin the standard process by which the radiology reading room liaison alerts the referring practitioner. Workstation performed: [id number]     XR stroke alert portable chest    Result Date: 10/31/2023  Impression: No focal consolidation, pleural effusion, or pneumothorax. Workstation performed: [id number]     CT stroke alert brain    Result Date: 10/31/2023  Impression: No acute infarction or other intracranial abnormality. Low-lying cerebellar tonsils without meeting criteria for Chiari I malformation. Findings were directly discussed with Dr. Merle Davies at 9:00 a.m. Resident: Maureen Eastman, the attending radiologist, have reviewed the images and agree with the final report above. Workstation performed: [id number]     CTA stroke alert (head/neck)    Result Date: 10/31/2023  Impression:  The examination is limited due to contrast timing. 1. CTA head: Negative for large vessel intracranial occlusion or hemodynamically significant stenosis. 2. CTA neck:  No extracranial carotid stenosis. The cervical vertebral arteries are patent. Findings were directly discussed with Dr. Lupe Ricardo at 9:00 a.m. Workstation performed: [id number]       Assessment and Recommendations:  76-year-old female with left facial numbness and left-sided weakness with gait instability secondary to right thalamic lacunar CVA. Impairments:  Impaired functional mobility and ability to perform ADL's      Recommendations:  - Chart reviewed  - Imaging reviewed   - Continue PT/OT/SLP while inpatient. - Pt is unable to safely return home until she is at the supervision/contact-guard functional level (25% assistance level with or without a device)  modified-independent functional level (0% assistance with a device) independent functional level (0% assistance without a device)          - Based upon patient's progress in PT anticipate need for outpatient versus short-term home assistance with home therapies to allow for achievement of modified-independent functional level. - Disposition unclear at this point in time but inpatient rehab a possibility in the near future pending achievement of clinically stability, potential for functional progress. Thank you for allowing the PM&R service to participate in the care of this patient. We will continue to follow Joe Hayes progress with you. Please do not hesitate to call with questions or concerns    ** Please Note: Fluency Direct voice to text software may have been used in the creation of this document.  **

## 2023-10-31 NOTE — ASSESSMENT & PLAN NOTE
Noted history of questionable low cortisol level; labs were checked in the afternoon when this was questioned   Most recent cortisol 14 in Feb '23  Check AM cortisol

## 2023-10-31 NOTE — CONSULTS
Consultation - Stroke   Jennifer Quiñones 60 y.o. female MRN: [id number]  Unit/Bed#: ED 23 Encounter: [id number]      Assessment/Plan     Stroke-like symptoms  Assessment & Plan  53-year-old female with diabetes, hypertension, obesity, who presented with strokelike symptoms. Patient reported left facial numbness and left sided weakness/gait instability. Stroke alert initiated in the ED. BP on presentation 226/135. NIHSS 2-left lower extremity drift and decreased sensation in left face/left lower extremity. Patient was deemed not a TNK candidate due to not being in the appropriate time window and resolving symptoms. Unclear etiology at this time. Suspect more likely hypertensive encephalopathy but cannot fully rule out stroke, therefore, will place on formal stroke pathway for further evaluation. Plan:  - Stroke pathway  MRI brain  Echo  Lipid Panel  Hemoglobin A1c  S/p aspirin 324 mg and Plavix 300 mg x 1, start aspirin 81 mg and Plavix 75 mg tomorrow AM  Atorvastatin 40 mg  Permissive HTN, labetalol if SBP >200 x 24 hours from symptom onset, then goal normontension and avoid hypotension. Continue telemetry  PT/OT/ST  Frequent neuro checks. Continue to monitor and notify neurology with any changes. - Medical management and supportive care per primary team. Correction of any metabolic or infectious disturbances. Results:  - CT head:  No acute infarction or other intracranial abnormality. Low-lying cerebellar tonsils without meeting criteria for Chiari I malformation.  - CTA head and neck:  1. CTA head: Negative for large vessel intracranial occlusion or hemodynamically significant stenosis. 2. CTA neck:  No extracranial carotid stenosis. The cervical vertebral arteries are patent.     Type 2 diabetes mellitus with other specified complication [facility])  Assessment & Plan  Lab Results   Component Value Date    HGBA1C 6.5 (H) 08/23/2023       Recent Labs     10/31/23  0807 10/31/23  1100   POCGLU 195* 111 Blood Sugar Average: Last 72 hrs:  (P) 153      - Medical management per primary team    * Hypertensive emergency  Assessment & Plan  - BP on presentation 226/135  - Medical management per primary team        Thrombolytic Decision: Patient not a candidate. Unclear time of onset outside appropriate time window. and Symptoms resolved/clearly non disabling. Recommendations for outpatient neurological follow up have yet to be determined. Case and treatment plan reviewed with attending neurologist, Dr. Scottie Angel. Please see attending attestation for any further recommendations. History of Present Illness     Reason for Consult / Principal Problem: Stroke-like symptoms  Patient last known well: 10/30 10:30 pm  Stroke alert called: 8:07 am  Neurology time of arrival: 8:12 am  HPI: Jennifer Quiñones is a 60 y.o.  female with diabetes, hypertension, obesity, who presents with stroke-like symptoms. Patient seen and evaluated at the bedside with attending neurologist.  Patient reports that she went to sleep around 10:30 PM last night and woke up around 12:30 AM with left facial numbness, including her left mouth and left tongue. She then went back to bed and woke up later in the morning, approximately 5:30 AM and reports that her symptoms had resolved. She went about her morning routine and then later noted that her symptoms restarted with a left facial numbness and now also left-sided weakness/gait instability. She said that she placed some chili powder in her mouth to check the numbness. She then drove herself to the ED for further evaluation. Stroke alert was initiated in the ED.  NIHSS 2. Upon returning from [id number] [facility], patient reports that her symptoms were improving. She denies being diabetic and reports that she takes natural supplements, such as cinnamon and garlic tablets, for hypertension. She is not on any antihypertensives at this time.     Per chart review, patient was seen by [facility] endocrinology in 09/2023 for diabetes and weight gain. Consult to Neurology  Consult performed by: OFE Beck  Consult ordered by: Yonny Caceres DO          Review of Systems  A 12 system ROS was completed. Other than the above mentioned complaints in the HPI and those commented on below, all remaining systems were negative. Historical Information   Past Medical History:   Diagnosis Date    Adrenal insufficiency ([address])     Diabetes type 2, controlled ([address])     Hypertension     No official diagnosis    Obesity      Past Surgical History:   Procedure Laterality Date    CARPAL TUNNEL RELEASE      COLONOSCOPY      CYST REMOVAL      NO PAST SURGERIES       Social History   Social History     Substance and Sexual Activity   Alcohol Use Yes    Comment: occasional     Social History     Substance and Sexual Activity   Drug Use No     E-Cigarette/Vaping    E-Cigarette Use Never User      E-Cigarette/Vaping Substances    Nicotine No     THC No     CBD No     Flavoring No     Other No     Unknown No      Social History     Tobacco Use   Smoking Status Never   Smokeless Tobacco Never     Family History:   Family History   Problem Relation Age of Onset    Diabetes Mother     Stroke Father     Hypertension Father     Cancer Neg Hx     Heart disease Neg Hx     Thyroid disease Neg Hx     Breast cancer Neg Hx     Ovarian cancer Neg Hx     Uterine cancer Neg Hx     Cervical cancer Neg Hx        Review of previous medical records was completed.     Meds/Allergies   all current active meds have been reviewed, current meds:   Current Facility-Administered Medications   Medication Dose Route Frequency    acetaminophen (TYLENOL) tablet 650 mg  650 mg Oral Q6H PRN    aluminum-magnesium hydroxide-simethicone (MAALOX) oral suspension 30 mL  30 mL Oral Q6H PRN    [START ON 11/1/2023] aspirin chewable tablet 81 mg  81 mg Oral Daily    atorvastatin (LIPITOR) tablet 40 mg  40 mg Oral QPM    enoxaparin (LOVENOX) subcutaneous injection 60 mg  60 mg Subcutaneous Q12H [facility]    insulin lispro (HumaLOG) 100 units/mL subcutaneous injection 2-12 Units  2-12 Units Subcutaneous TID AC    insulin lispro (HumaLOG) 100 units/mL subcutaneous injection 2-12 Units  2-12 Units Subcutaneous HS    ondansetron (ZOFRAN) injection 4 mg  4 mg Intravenous Q6H PRN    polyethylene glycol (MIRALAX) packet 17 g  17 g Oral Daily PRN   , and PTA meds:   Prior to Admission Medications   Prescriptions Last Dose Informant Patient Reported? Taking? Calcium-Magnesium 300-300 MG TABS  Self Yes No   Sig: Take by mouth   Cyanocobalamin (B-12 PO)  Self Yes No   Sig: Take by mouth   Garlic 10 MG CAPS   Yes No   betamethasone valerate (VALISONE) 0.1 % cream   Yes No   Sig: APPLY IN LEFT EAR TWICE A DAY AS DIRECTED if needed   fluticasone (FLONASE) 50 mcg/act nasal spray   Yes No   Si spray 2 (two) times a day   lisinopril (ZESTRIL) 10 mg tablet   Yes No   Si tab(s)   loratadine (CLARITIN) 10 mg tablet   Yes No   Sig: Take 10 mg by mouth daily   multivitamin (THERAGRAN) TABS   Yes No   Sig: Take 1 tablet by mouth   neomycin-polymyxin-dexamethasone (MAXITROL) ophthalmic suspension   Yes No   Sig: instill 5 drops IN LEFT EAR TWICE A DAY   tirzepatide 2.5 MG/0.5ML   Yes Yes   Sig: Inject 2.5 mg under the skin every 7 days      Facility-Administered Medications: None       No Known Allergies    Objective   Vitals:Blood pressure 151/78, pulse 77, temperature 98.3 °F (36.8 °C), resp. rate 18, height 5' (1.524 m), weight 129 kg (284 lb 6.3 oz), SpO2 97 %. ,Body mass index is 55.54 kg/m². No intake or output data in the 24 hours ending 10/31/23 1106    Invasive Devices: Invasive Devices       Peripheral Intravenous Line  Duration             Peripheral IV 10/31/23 Left Antecubital <1 day                    Physical and neurologic exam performed by attending neurologist:  Physical Exam  Vitals and nursing note reviewed. Constitutional:       General: She is not in acute distress. Appearance: She is obese. She is not ill-appearing. HENT:      Head: Normocephalic. Mouth/Throat:      Mouth: Mucous membranes are moist.      Pharynx: Oropharynx is clear. Eyes:      General: No scleral icterus. Right eye: No discharge. Left eye: No discharge. Extraocular Movements: Extraocular movements intact and EOM normal.      Conjunctiva/sclera: Conjunctivae normal.   Cardiovascular:      Rate and Rhythm: Normal rate. Pulmonary:      Effort: Pulmonary effort is normal. No respiratory distress. Musculoskeletal:         General: Normal range of motion. Cervical back: Normal range of motion. Skin:     General: Skin is warm and dry. Coloration: Skin is not jaundiced or pale. Neurological:      Mental Status: She is alert and oriented to person, place, and time. Coordination: Finger-Nose-Finger Test and Heel to [employer] normal.   Psychiatric:      Comments: Tearful       Neurologic Exam     Mental Status   Oriented to person, place, and time. Level of consciousness: alert  Able to follow commands appropriately. Able to name objects and repeat phrases correctly. No dysarthria noted. Cranial Nerves     CN II   Right visual field deficit: none  Left visual field deficit: none     CN III, IV, VI   Extraocular motions are normal.     CN VII   Facial expression full, symmetric.      CN VIII   Hearing: intact    CN IX, X   Palate: symmetric    CN XI   CN XI normal.     CN XII   CN XII normal.   Decreased left facial sensation     Motor Exam   Muscle bulk: normal  Right arm pronator drift: absent  Left arm pronator drift: absent  Full strength throughout bilateral upper/lower extremities for slight drift noted in the left lower extremity when held antigravity     Sensory Exam   Decreased sensation to pinprick in the left lower extremity  Intact sensation to pinprick in bilateral upper extremities and right lower extremity  No extinction noted     Gait, Coordination, and Reflexes     Coordination   Finger to nose coordination: normal  Heel to shin coordination: normal    Tremor   Resting tremor: absent  Intention tremor: absent    Reflexes   Right plantar: equivocal  Left plantar: equivocal      NIHSS:  1a.Level of Consciousness: 0 = Alert   1b. LOC Questions: 0 = Answers both correctly   1c. LOC Commands: 0 = Obeys both correctly   2. Best Gaze: 0 = Normal   3. Visual: 0 = No visual field loss   4. Facial Palsy: 0=Normal symmetric movement   5a. Motor Right Arm: 0=No drift, limb holds 90 (or 45) degrees for full 10 seconds   5b. Motor Left Arm: 0=No drift, limb holds 90 (or 45) degrees for full 10 seconds   6a. Motor Right Le=No drift, limb holds 90 (or 45) degrees for full 10 seconds   6b. Motor Left Le=Drift, limb holds 90 (or 45) degrees but drifts down before full 10 seconds: does not hit bed   7. Limb Ataxia:  0=Absent   8. Sensory: 1=Mild to moderate sensory loss; patient feels pinprick is less sharp or is dull on the affected side; there is a loss of superficial pain with pinprick but patient is aware She is being touched   9. Best Language:  0=No aphasia, normal   10. Dysarthria: 0=Normal articulation   11. Extinction and Inattention (formerly Neglect): 0=No abnormality   Total Score: 2     Time NIHSS was completed: 8:36    Modified [place] Score:  Unable to determine currently, will gather additional data    Lab Results: I have personally reviewed pertinent reports.   , CBC:   Results from last 7 days   Lab Units 10/31/23  0809   WBC Thousand/uL 7.56   RBC Million/uL 4.69   HEMOGLOBIN g/dL 14.2   HEMATOCRIT % 43.1   MCV fL 92   PLATELETS Thousands/uL 257   , BMP/CMP:   Results from last 7 days   Lab Units 10/31/23  0809   SODIUM mmol/L 137   POTASSIUM mmol/L 3.5   CHLORIDE mmol/L 103   CO2 mmol/L 27   BUN mg/dL 14   CREATININE mg/dL 0.85   CALCIUM mg/dL 9.1   EGFR ml/min/1.73sq m 75   , Vitamin B12:   , HgBA1C:   , TSH:   , Coagulation:   Results from last 7 days   Lab Units 10/31/23  0809   INR  1.01   , Lipid Profile:   , Ammonia:   , Urinalysis:       Invalid input(s): "URIBILINOGEN", Drug Screen:   , Medication Drug Levels:       Invalid input(s): "CARBAMAZEPINE", "LACOSAMIDE", "OXCARBAZEPINE"    Imaging Studies: I have personally reviewed pertinent reports. and I have personally reviewed pertinent films in PACS  EKG, Pathology, and Other Studies: I have personally reviewed pertinent reports.     VTE Prophylaxis: Reason for no pharmacologic prophylaxis patient currently in ED    Code Status: Level 1 - Full Code  Advance Directive and Living Will:      Power of :    POLST:

## 2023-10-31 NOTE — PLAN OF CARE
Problem: Potential for Falls  Goal: Patient will remain free of falls  Description: INTERVENTIONS:  - Educate patient/family on patient safety including physical limitations  - Instruct patient to call for assistance with activity   - Consult OT/PT to assist with strengthening/mobility   - Keep Call bell within reach  - Keep bed low and locked with side rails adjusted as appropriate  - Keep care items and personal belongings within reach  - Initiate and maintain comfort rounds  - Make Fall Risk Sign visible to staff  - Offer Toileting every 2 Hours, in advance of need  - Initiate/Maintain bed alarm  - Obtain necessary fall risk management equipment:   - Apply yellow socks and bracelet for high fall risk patients  - Consider moving patient to room near nurses station  Outcome: Progressing     Problem: PAIN - ADULT  Goal: Verbalizes/displays adequate comfort level or baseline comfort level  Description: Interventions:  - Encourage patient to monitor pain and request assistance  - Assess pain using appropriate pain scale  - Administer analgesics based on type and severity of pain and evaluate response  - Implement non-pharmacological measures as appropriate and evaluate response  - Consider cultural and social influences on pain and pain management  - Notify physician/advanced practitioner if interventions unsuccessful or patient reports new pain  Outcome: Progressing     Problem: SAFETY ADULT  Goal: Patient will remain free of falls  Description: INTERVENTIONS:  - Educate patient/family on patient safety including physical limitations  - Instruct patient to call for assistance with activity   - Consult OT/PT to assist with strengthening/mobility   - Keep Call bell within reach  - Keep bed low and locked with side rails adjusted as appropriate  - Keep care items and personal belongings within reach  - Initiate and maintain comfort rounds  - Make Fall Risk Sign visible to staff  - Offer Toileting every 2 Hours, in advance of need  - Initiate/Maintain bed alarm  - Obtain necessary fall risk management equipment:   - Apply yellow socks and bracelet for high fall risk patients  - Consider moving patient to room near nurses station  Outcome: Progressing  Goal: Maintain or return to baseline ADL function  Description: INTERVENTIONS:  - Educate patient/family on patient safety including physical limitations  - Instruct patient to call for assistance with activity   - Consult OT/PT to assist with strengthening/mobility   - Keep Call bell within reach  - Keep bed low and locked with side rails adjusted as appropriate  - Keep care items and personal belongings within reach  - Initiate and maintain comfort rounds  - Make Fall Risk Sign visible to staff  - Offer Toileting every 2 Hours, in advance of need  - Initiate/Maintain bed alarm  - Obtain necessary fall risk management equipment:   - Apply yellow socks and bracelet for high fall risk patients  - Consider moving patient to room near nurses station  Outcome: Progressing  Goal: Maintains/Returns to pre admission functional level  Description: INTERVENTIONS:  -  Assess patient's ability to carry out ADLs; assess patient's baseline for ADL function and identify physical deficits which impact ability to perform ADLs (bathing, care of mouth/teeth, toileting, grooming, dressing, etc.)  - Assess/evaluate cause of self-care deficits   - Assess range of motion  - Assess patient's mobility; develop plan if impaired  - Assess patient's need for assistive devices and provide as appropriate  - Encourage maximum independence but intervene and supervise when necessary  - Involve family in performance of ADLs  - Assess for home care needs following discharge   - Consider OT consult to assist with ADL evaluation and planning for discharge  - Provide patient education as appropriate  Outcome: Progressing     Problem: DISCHARGE PLANNING  Goal: Discharge to home or other facility with appropriate resources  Description: INTERVENTIONS:  - Identify barriers to discharge w/patient and caregiver  - Arrange for needed discharge resources and transportation as appropriate  - Identify discharge learning needs (meds, wound care, etc.)  - Arrange for interpretive services to assist at discharge as needed  - Refer to Case Management Department for coordinating discharge planning if the patient needs post-hospital services based on physician/advanced practitioner order or complex needs related to functional status, cognitive ability, or social support system  Outcome: Progressing     Problem: Neurological Deficit  Goal: Neurological status is stable or improving  Description: Interventions:  - Monitor and assess patient's level of consciousness, motor function, sensory function, and level of assistance needed for ADLs. - Monitor and report changes from baseline. Collaborate with interdisciplinary team to initiate plan and implement interventions as ordered. - Provide and maintain a safe environment. - Consider seizure precautions. - Consider fall precautions. - Consider aspiration precautions. - Consider bleeding precautions. Outcome: Progressing     Problem: Activity Intolerance/Impaired Mobility  Goal: Mobility/activity is maintained at optimum level for patient  Description: Interventions:  - Assess and monitor patient  barriers to mobility and need for assistive/adaptive devices. - Assess patient's emotional response to limitations. - Collaborate with interdisciplinary team and initiate plans and interventions as ordered. - Encourage independent activity per ability.  - Maintain proper body alignment. - Perform active/passive rom as tolerated/ordered.   - Plan activities to conserve energy.  - Turn patient as appropriate  Outcome: Progressing

## 2023-10-31 NOTE — ASSESSMENT & PLAN NOTE
Presents with significantly elevated /135, in setting of non adherence to antihypertensives associated with left sided weakness/numbness and facial droop    Due to concern for TIA/CVA, will allow for permissive hypertension  Monitor vitals per stroke protocol  Restart lisinopril in am if BP allows   IV hydralazine prn persistent elevations >359 systolic

## 2023-10-31 NOTE — H&P
[address]  H&P  Name: Terri Guajardo 60 y.o. female I MRN: [id number]  Unit/Bed#: ED 23 I Date of Admission: 10/31/2023   Date of Service: 10/31/2023 I Hospital Day: 0      Assessment/Plan   * Hypertensive urgency  Assessment & Plan  Presents with significantly elevated /135, in setting of non adherence to antihypertensives associated with left sided weakness/numbness and facial droop    Due to concern for TIA/CVA, will allow for permissive hypertension  Monitor vitals per stroke protocol  Restart lisinopril in am if BP allows   IV hydralazine prn persistent elevations >151 systolic    Left facial numbness  Assessment & Plan  Presents c/o left facial numbness which began last night, appeared resolved upon awaking this morning but shortly thereafter noticed return of left facial numbness with associated left upper and lower extremity numbness and weakness and facial droop. [address] (10/31): No acute infarction or other intracranial abnormality. Low-lying cerebellar tonsils without meeting criteria for Chiari I malformation   CTA head/neck (10/31): limited due to contrast timing; no large vessel intracranial occlusion or hemodynamically significant stenosis. No extracranial carotid stenosis. The cervical vertebral arteries are patent    Stroke pathway initiated,  No tPA/TNK given due to initial onset >4.5 hrs ago, NIHSS 2  Neuro consult  PT, OT, SLP consults   Neurochecks per protocol  Telemetry monitor   s/p aspirin/plavix load, start statin  Check A1c, lipid panel  Stat CTH for any acute neuro changes  Lovenox for DVT prophylaxis  MRI, echo pending    Abnormal cortisol level  Assessment & Plan  Noted history of questionable low cortisol level; labs were checked in the afternoon when this was questioned   Most recent cortisol 14 in Feb '23  Check AM cortisol     Morbid obesity with BMI of 50.0-59.9, adult (HCC)  Assessment & Plan  Body mass index is 59.44 kg/m².     Recommend incorporating a more whole foods plant-predominant diet along with decreasing consumption of red meats and processed foods  Per AHA guidelines, recommend moderate-vigorous intensity exercise for 30 minutes a day for 5 days a week or a total of 150 min/week    Type 2 diabetes mellitus with other specified complication [facility])  Assessment & Plan  Lab Results   Component Value Date    HGBA1C 6.5 (H) 08/23/2023       Recent Labs     10/31/23  0807   POCGLU 195*       Blood Sugar Average: Last 72 hrs:  (P) 195  Hold GLP-1, recently started by endocrinologist but unfortunately not covered by insurance   Start SSI # 4 (weight based dose)  Diabetic diet level 3  [facility]  Hypoglycemia protocol  Adjust as needed       Nonadherence to medication  Assessment & Plan  Counseled on need to maintain all medications as prescribed to prevent adverse events            VTE Pharmacologic Prophylaxis: VTE Score: 4 Moderate Risk (Score 3-4) - Pharmacological DVT Prophylaxis Ordered: enoxaparin (Lovenox). Code Status: Level 1 - Full Code   Discussion with family: Patient declined call to . Anticipated Length of Stay: Patient will be admitted on an observation basis with an anticipated length of stay of less than 2 midnights secondary to stroke rule out . Total Time Spent on Date of Encounter in care of patient: 65 mins. This time was spent on one or more of the following: performing physical exam; counseling and coordination of care; obtaining or reviewing history; documenting in the medical record; reviewing/ordering tests, medications or procedures; communicating with other healthcare professionals and discussing with patient's family/caregivers.     Chief Complaint: left sided numbness, weakness; hypertension     History of Present Illness:  Dylon Donahue is a 60 y.o. female with a PMH of morbid obesity, diabetes type 2, hypertension, adrenal insufficiency who presents with left sided facial numbness which progressed to left upper and lower ready extremity numbness and weakness with left facial droop. Stroke alert was called, neurology has already seen the patient. Patient reports she has been in her normal state of health, but that she is noncompliant with her blood pressure medications as she prefers naturopathic route instead. Review of Systems:  Review of Systems    Past Medical and Surgical History:   Past Medical History:   Diagnosis Date    Adrenal insufficiency ([address])     Diabetes type 2, controlled ([address])     Hypertension     No official diagnosis    Obesity        Past Surgical History:   Procedure Laterality Date    CARPAL TUNNEL RELEASE      COLONOSCOPY      CYST REMOVAL      NO PAST SURGERIES         Meds/Allergies:  Prior to Admission medications    Medication Sig Start Date End Date Taking? Authorizing Provider   tirzepatide 2.5 MG/0.5ML Inject 2.5 mg under the skin every 7 days   Yes Historical Provider, MD   betamethasone valerate (VALISONE) 0.1 % cream APPLY IN LEFT EAR TWICE A DAY AS DIRECTED if needed 11/3/22   Historical Provider, MD   Calcium-Magnesium 300-300 MG TABS Take by mouth    Historical Provider, MD   Cyanocobalamin (B-12 PO) Take by mouth    Historical Provider, MD   fluticasone (FLONASE) 50 mcg/act nasal spray 1 spray 2 (two) times a day 11/3/22   Historical Provider, MD   Garlic 10 MG CAPS     Historical Provider, MD   lisinopril (ZESTRIL) 10 mg tablet 1 tab(s) 2/20/17   Historical Provider, MD   loratadine (CLARITIN) 10 mg tablet Take 10 mg by mouth daily 11/3/22   Historical Provider, MD   multivitamin (THERAGRAN) TABS Take 1 tablet by mouth    Historical Provider, MD   neomycin-polymyxin-dexamethasone (MAXITROL) ophthalmic suspension instill 5 drops IN LEFT EAR TWICE A DAY 11/3/22   Historical Provider, MD PLASCENCIA have reviewed home medications with patient personally.     Allergies: No Known Allergies    Social History:  Marital Status: Unknown   Occupation: n/a Patient Pre-hospital Living Situation: Home  Patient Pre-hospital Level of Mobility: walks  Patient Pre-hospital Diet Restrictions: Diabetic  Substance Use History:   Social History     Substance and Sexual Activity   Alcohol Use Yes    Comment: occasional     Social History     Tobacco Use   Smoking Status Never   Smokeless Tobacco Never     Social History     Substance and Sexual Activity   Drug Use No       Family History:  Family History   Problem Relation Age of Onset    Diabetes Mother     Stroke Father     Hypertension Father     Cancer Neg Hx     Heart disease Neg Hx     Thyroid disease Neg Hx     Breast cancer Neg Hx     Ovarian cancer Neg Hx     Uterine cancer Neg Hx     Cervical cancer Neg Hx        Physical Exam:     Vitals:   Blood Pressure: (!) 178/85 (10/31/23 1100)  Pulse: 76 (10/31/23 1100)  Temperature: 98.3 °F (36.8 °C) (10/31/23 0802)  Respirations: (!) 23 (10/31/23 1100)  Height: 5' (152.4 cm) (10/31/23 1000)  Weight - Scale: 129 kg (284 lb 6.3 oz) (10/31/23 0808)  SpO2: 96 % (10/31/23 1100)    Physical Exam      Additional Data:     Lab Results:  Results from last 7 days   Lab Units 10/31/23  0809   WBC Thousand/uL 7.56   HEMOGLOBIN g/dL 14.2   HEMATOCRIT % 43.1   PLATELETS Thousands/uL 257     Results from last 7 days   Lab Units 10/31/23  0809   SODIUM mmol/L 137   POTASSIUM mmol/L 3.5   CHLORIDE mmol/L 103   CO2 mmol/L 27   BUN mg/dL 14   CREATININE mg/dL 0.85   ANION GAP mmol/L 7   CALCIUM mg/dL 9.1   GLUCOSE RANDOM mg/dL 206*     Results from last 7 days   Lab Units 10/31/23  0809   INR  1.01     Results from last 7 days   Lab Units 10/31/23  1100 10/31/23  0807   POC GLUCOSE mg/dl 111 195*               Lines/Drains:  Invasive Devices       Peripheral Intravenous Line  Duration             Peripheral IV 10/31/23 Left Antecubital <1 day                        Imaging: Reviewed radiology reports from this admission including: CT head, CTA head/neck  XR stroke alert portable chest   Final Result by Colten Quevedo MD (10/31 1111)      No focal consolidation, pleural effusion, or pneumothorax. Workstation performed: [id number]         CTA stroke alert (head/neck)   Final Result by Prem Perry MD (10/31 0903)   The examination is limited due to contrast timing. 1. CTA head: Negative for large vessel intracranial occlusion or hemodynamically significant stenosis. 2. CTA neck:  No extracranial carotid stenosis. The cervical vertebral arteries are patent. Findings were directly discussed with Dr. Nitin Ruff at 9:00 a.m. Workstation performed: [id number]         CT stroke alert brain   Final Result by Prem Perry MD (10/31 0909)      No acute infarction or other intracranial abnormality. Low-lying cerebellar tonsils without meeting criteria for Chiari I malformation. Findings were directly discussed with Dr. Nitin Ruff at 9:00 a.m. Resident: Natividad Cardenas, the attending radiologist, have reviewed the images and agree with the final report above. Workstation performed: [id number]         MRI Inpatient Order    (Results Pending)       EKG and Other Studies Reviewed on Admission:   EKG: NSR. HR 86.    ** Please Note: This note has been constructed using a voice recognition system.  **

## 2023-10-31 NOTE — ASSESSMENT & PLAN NOTE
Lab Results   Component Value Date    HGBA1C 6.5 (H) 08/23/2023       Recent Labs     10/31/23  0807   POCGLU 195*       Blood Sugar Average: Last 72 hrs:  (P) 195  Hold GLP-1, recently started by endocrinologist but unfortunately not covered by insurance   Start SSI # 4 (weight based dose)  Diabetic diet level 3  Blood glucose monitoring [facility]  Hypoglycemia protocol  Adjust as needed

## 2023-11-01 ENCOUNTER — APPOINTMENT (OUTPATIENT)
Dept: NON INVASIVE DIAGNOSTICS | Facility: HOSPITAL | Age: 58
End: 2023-11-01
Payer: COMMERCIAL

## 2023-11-01 VITALS
SYSTOLIC BLOOD PRESSURE: 139 MMHG | TEMPERATURE: 98.3 F | DIASTOLIC BLOOD PRESSURE: 73 MMHG | HEIGHT: 60 IN | RESPIRATION RATE: 18 BRPM | HEART RATE: 70 BPM | BODY MASS INDEX: 55.76 KG/M2 | WEIGHT: 284 LBS | OXYGEN SATURATION: 95 %

## 2023-11-01 PROBLEM — R79.89 ABNORMAL CORTISOL LEVEL: Status: RESOLVED | Noted: 2018-02-15 | Resolved: 2023-11-01

## 2023-11-01 PROBLEM — I63.9 STROKE (CEREBRUM) (HCC): Status: ACTIVE | Noted: 2023-10-31

## 2023-11-01 LAB
ALBUMIN SERPL BCP-MCNC: 3.7 G/DL (ref 3.5–5)
ALP SERPL-CCNC: 57 U/L (ref 34–104)
ALT SERPL W P-5'-P-CCNC: 17 U/L (ref 7–52)
ANION GAP SERPL CALCULATED.3IONS-SCNC: 7 MMOL/L
AORTIC ROOT: 3 CM
APICAL FOUR CHAMBER EJECTION FRACTION: 65 %
ASCENDING AORTA: 2.7 CM
AST SERPL W P-5'-P-CCNC: 14 U/L (ref 13–39)
BILIRUB SERPL-MCNC: 0.52 MG/DL (ref 0.2–1)
BUN SERPL-MCNC: 13 MG/DL (ref 5–25)
CALCIUM SERPL-MCNC: 8.9 MG/DL (ref 8.4–10.2)
CHLORIDE SERPL-SCNC: 105 MMOL/L (ref 96–108)
CHOLEST SERPL-MCNC: 196 MG/DL
CO2 SERPL-SCNC: 25 MMOL/L (ref 21–32)
CREAT SERPL-MCNC: 0.69 MG/DL (ref 0.6–1.3)
E WAVE DECELERATION TIME: 215 MS
E/A RATIO: 1.12
ERYTHROCYTE [DISTWIDTH] IN BLOOD BY AUTOMATED COUNT: 13.4 % (ref 11.6–15.1)
EST. AVERAGE GLUCOSE BLD GHB EST-MCNC: 140 MG/DL
FRACTIONAL SHORTENING: 37 (ref 28–44)
GFR SERPL CREATININE-BSD FRML MDRD: 95 ML/MIN/1.73SQ M
GLUCOSE P FAST SERPL-MCNC: 130 MG/DL (ref 65–99)
GLUCOSE SERPL-MCNC: 120 MG/DL (ref 65–140)
GLUCOSE SERPL-MCNC: 130 MG/DL (ref 65–140)
GLUCOSE SERPL-MCNC: 91 MG/DL (ref 65–140)
HBA1C MFR BLD: 6.5 %
HCT VFR BLD AUTO: 40.8 % (ref 34.8–46.1)
HDLC SERPL-MCNC: 55 MG/DL
HGB BLD-MCNC: 13.5 G/DL (ref 11.5–15.4)
INTERVENTRICULAR SEPTUM IN DIASTOLE (PARASTERNAL SHORT AXIS VIEW): 0.8 CM
INTERVENTRICULAR SEPTUM: 0.8 CM (ref 0.6–1.1)
LAAS-AP2: 14.3 CM2
LAAS-AP4: 14.1 CM2
LDLC SERPL CALC-MCNC: 114 MG/DL (ref 0–100)
LEFT ATRIUM SIZE: 3.7 CM
LEFT ATRIUM VOLUME (MOD BIPLANE): 34 ML
LEFT ATRIUM VOLUME INDEX (MOD BIPLANE): 16 ML/M2
LEFT INTERNAL DIMENSION IN SYSTOLE: 2.9 CM (ref 2.1–4)
LEFT VENTRICLE DIASTOLIC VOLUME (MOD BIPLANE): 75 ML
LEFT VENTRICLE SYSTOLIC VOLUME (MOD BIPLANE): 28 ML
LEFT VENTRICULAR INTERNAL DIMENSION IN DIASTOLE: 4.6 CM (ref 3.5–6)
LEFT VENTRICULAR POSTERIOR WALL IN END DIASTOLE: 0.9 CM
LEFT VENTRICULAR STROKE VOLUME: 64 ML
LV EF: 63 %
LVSV (TEICH): 64 ML
MAGNESIUM SERPL-MCNC: 2.1 MG/DL (ref 1.9–2.7)
MCH RBC QN AUTO: 30.1 PG (ref 26.8–34.3)
MCHC RBC AUTO-ENTMCNC: 33.1 G/DL (ref 31.4–37.4)
MCV RBC AUTO: 91 FL (ref 82–98)
MV E'TISSUE VEL-SEP: 8 CM/S
MV PEAK A VEL: 0.65 M/S
MV PEAK E VEL: 73 CM/S
MV STENOSIS PRESSURE HALF TIME: 62 MS
MV VALVE AREA P 1/2 METHOD: 3.55
PLATELET # BLD AUTO: 230 THOUSANDS/UL (ref 149–390)
PMV BLD AUTO: 9.6 FL (ref 8.9–12.7)
POTASSIUM SERPL-SCNC: 3.6 MMOL/L (ref 3.5–5.3)
PROT SERPL-MCNC: 7 G/DL (ref 6.4–8.4)
RBC # BLD AUTO: 4.48 MILLION/UL (ref 3.81–5.12)
RIGHT ATRIUM AREA SYSTOLE A4C: 17.2 CM2
RIGHT VENTRICLE ID DIMENSION: 3.2 CM
SL CV LEFT ATRIUM LENGTH A2C: 5 CM
SL CV LV EF: 60
SL CV PED ECHO LEFT VENTRICLE DIASTOLIC VOLUME (MOD BIPLANE) 2D: 96 ML
SL CV PED ECHO LEFT VENTRICLE SYSTOLIC VOLUME (MOD BIPLANE) 2D: 32 ML
SODIUM SERPL-SCNC: 137 MMOL/L (ref 135–147)
TRICUSPID ANNULAR PLANE SYSTOLIC EXCURSION: 2.5 CM
TRIGL SERPL-MCNC: 134 MG/DL
WBC # BLD AUTO: 6.09 THOUSAND/UL (ref 4.31–10.16)

## 2023-11-01 PROCEDURE — 99214 OFFICE O/P EST MOD 30 MIN: CPT | Performed by: PSYCHIATRY & NEUROLOGY

## 2023-11-01 PROCEDURE — 99239 HOSP IP/OBS DSCHRG MGMT >30: CPT | Performed by: PHYSICIAN ASSISTANT

## 2023-11-01 PROCEDURE — 99232 SBSQ HOSP IP/OBS MODERATE 35: CPT | Performed by: PHYSICIAN ASSISTANT

## 2023-11-01 PROCEDURE — 97165 OT EVAL LOW COMPLEX 30 MIN: CPT

## 2023-11-01 PROCEDURE — 83036 HEMOGLOBIN GLYCOSYLATED A1C: CPT | Performed by: PHYSICIAN ASSISTANT

## 2023-11-01 PROCEDURE — 80053 COMPREHEN METABOLIC PANEL: CPT | Performed by: PHYSICIAN ASSISTANT

## 2023-11-01 PROCEDURE — 85027 COMPLETE CBC AUTOMATED: CPT | Performed by: PHYSICIAN ASSISTANT

## 2023-11-01 PROCEDURE — 93306 TTE W/DOPPLER COMPLETE: CPT | Performed by: INTERNAL MEDICINE

## 2023-11-01 PROCEDURE — 82948 REAGENT STRIP/BLOOD GLUCOSE: CPT

## 2023-11-01 PROCEDURE — 97161 PT EVAL LOW COMPLEX 20 MIN: CPT

## 2023-11-01 PROCEDURE — 93306 TTE W/DOPPLER COMPLETE: CPT

## 2023-11-01 PROCEDURE — 80061 LIPID PANEL: CPT | Performed by: PHYSICIAN ASSISTANT

## 2023-11-01 PROCEDURE — 83735 ASSAY OF MAGNESIUM: CPT | Performed by: PHYSICIAN ASSISTANT

## 2023-11-01 RX ORDER — ATORVASTATIN CALCIUM 40 MG/1
40 TABLET, FILM COATED ORAL EVERY EVENING
Qty: 30 TABLET | Refills: 0 | Status: SHIPPED | OUTPATIENT
Start: 2023-11-01

## 2023-11-01 RX ORDER — LISINOPRIL 5 MG/1
5 TABLET ORAL DAILY
Qty: 30 TABLET | OUTPATIENT
Start: 2023-11-01

## 2023-11-01 RX ORDER — ASPIRIN 81 MG/1
81 TABLET, CHEWABLE ORAL DAILY
Qty: 30 TABLET | Refills: 0 | OUTPATIENT
Start: 2023-11-02

## 2023-11-01 RX ORDER — LISINOPRIL 5 MG/1
5 TABLET ORAL DAILY
Qty: 30 TABLET | Refills: 0 | Status: SHIPPED | OUTPATIENT
Start: 2023-11-01

## 2023-11-01 RX ORDER — ASPIRIN 81 MG/1
81 TABLET, CHEWABLE ORAL DAILY
Refills: 0
Start: 2023-11-02

## 2023-11-01 RX ORDER — CLOPIDOGREL BISULFATE 75 MG/1
75 TABLET ORAL DAILY
Qty: 21 TABLET | Refills: 0 | Status: SHIPPED | OUTPATIENT
Start: 2023-11-02

## 2023-11-01 RX ADMIN — CLOPIDOGREL BISULFATE 75 MG: 75 TABLET ORAL at 09:20

## 2023-11-01 RX ADMIN — ASPIRIN 81 MG: 81 TABLET, CHEWABLE ORAL at 09:20

## 2023-11-01 RX ADMIN — ENOXAPARIN SODIUM 60 MG: 60 INJECTION SUBCUTANEOUS at 09:20

## 2023-11-01 NOTE — ASSESSMENT & PLAN NOTE
Confirmed by MRI  Will need outpatient neuro follow up  Suggest cardiac monitoring outpatient to rule out dysrhythmia

## 2023-11-01 NOTE — PHYSICAL THERAPY NOTE
Physical Therapy Evaluation     Patient's Name: Shell Martinez    Admitting Diagnosis  Numbness [R20.0]  Dizziness [R42]  Facial numbness [R20.0]    Problem List  Patient Active Problem List   Diagnosis    Hypertensive emergency    Morbid obesity with BMI of 50.0-59.9, adult ([address])    Encounter for annual routine gynecological examination    Mixed hyperlipidemia    Morbidly obese ([address])    Tubular adenoma of colon    Left facial numbness    Nonadherence to medication    Type 2 diabetes mellitus with other specified complication ([address])    Stroke (cerebrum) ([address])     Past Medical History  Past Medical History:   Diagnosis Date    Diabetes type 2, controlled ([address])     Hypertension     No official diagnosis    Obesity      Past Surgical History  Past Surgical History:   Procedure Laterality Date    CARPAL TUNNEL RELEASE      COLONOSCOPY      CYST REMOVAL      NO PAST SURGERIES        11/01/23 0714   PT Last Visit   PT Visit Date 11/01/23   Note Type   Note type Evaluation   Pain Assessment   Pain Assessment Tool 0-10   Pain Score 5   Pain Location/Orientation Orientation: Bilateral;Location: Shoulder   Pain Onset/Description Onset: Ongoing   Hospital Pain Intervention(s) Repositioned; Ambulation/increased activity   Restrictions/Precautions   Weight Bearing Precautions Per Order No   Braces or Orthoses Other (Comment)  (none per patient)   Other Precautions Telemetry;Pain   Home Living   Type of [address] [place] [address] Two level;Stairs to enter with rails;Bed/bath upstairs  (5 FAY; 8+6 steps to 2nd floor)   Bathroom Shower/Tub Walk-in shower   Bathroom Toilet Standard   Bathroom Equipment Other (Comment)  (none per patient)   [address] Other (Comment)  (none per patient)   Additional Comments Pt ambulates without an AD. Prior Function   Level of [place] Independent with functional mobility; Independent with ADLs; Independent with IADLS   Lives With Spouse; Family   Receives Help From Family   IADLs Independent with driving; Independent with meal prep; Independent with medication management   Falls in the last 6 months 0   Vocational Other (Comment)   General   Family/Caregiver Present No   Cognition   Overall Cognitive Status WFL   Arousal/Participation Alert   Orientation Level Oriented X4   Memory Within functional limits   Following Commands Follows all commands and directions without difficulty   Comments Pt agreeable to PT. Subjective   Subjective "My left side felt heavy."   RLE Assessment   RLE Assessment WFL   LLE Assessment   LLE Assessment WFL   Vision-Basic Assessment   Current Vision Wears glasses only for reading   Light Touch   RLE Light Touch Grossly intact   LLE Light Touch Grossly intact  (pt reports "numbness at her upper thigh")   Bed Mobility   Supine to Sit 6  Modified independent   Additional items HOB elevated; Bedrails; Increased time required   Transfers   Sit to Stand 7  Independent   Stand to Sit 7  Independent   Ambulation/Elevation   Gait pattern WNL   Gait Assistance 6  Modified independent   Assistive Device None   Distance 250 feet   Stair Management Assistance 6  Modified independent   Additional items Verbal cues   Stair Management Technique One rail R;Alternating pattern; Foreward   Number of Stairs 6   Balance   Static Sitting Normal   Dynamic Sitting Good   Static Standing Good   Dynamic Standing Good   Ambulatory Good   Endurance Deficit   Endurance Deficit No   Activity Tolerance   Activity Tolerance Patient tolerated treatment well   Medical Staff Made Aware care coordination with OT   Nurse Made Aware RN aware   Assessment   Prognosis Good   Problem List Pain   Assessment Pt is 60year old female seen for PT evaluation s/p admit to [facility] on 10/31/2023 with Stroke (cerebrum) ([address] PT consulted to assess pt's functional mobility and discharge needs. Order placed for PT evaluation and treatment, with up and out of bed as tolerated order. Comorbidities affecting pt's physical performance at time of assessment include hypertensive emergency, morbid obesity, left facial numbness, non-adherence to medication, and type 2 DM. Prior to hospitalization, pt was independent with all functional mobility without an AD. Pt ambulates unrestricted distances on all terrain and elevations. Pt resides with her spouse and family, in a two level house with 5 steps to enter and 8+6 steps to the 2nd floor. Personal factors affecting pt at time of initial evaluation include lives in a two story house and stairs to enter home. Please find objective findings from PT assessment regarding body systems outlined above. The following objective measures were performed on initial evaluation Barthel Index: 100/100, Modified [place]: 1 (no significant disability), and AM-PAC 6-Clicks: 90/39. Pt's clinical presentation is currently stable seen in pt's presentation of need for ongoing medical management/monitoring. The patient is independent with transfers and functional mobility. No acute PT impairments identified. From a PT standpoint, recommendation at time of discharge would be no post acute rehabilitation needs. Plan to discontinue PT at this time.    Barriers to Discharge None   Goals   Patient Goals to go home   Plan   PT Frequency Other (Comment)  (discontinue PT)   Discharge Recommendation   Rehab Resource Intensity Level, PT No post-acute rehabilitation needs   AM-PAC Basic Mobility Inpatient   Turning in Flat Bed Without Bedrails 4   Lying on Back to Sitting on Edge of Flat Bed Without Bedrails 4   Moving Bed to Chair 4   Standing Up From Chair Using Arms 4   Walk in Room 4   Climb 3-5 Stairs With Railing 4   Basic Mobility Inpatient Raw Score 24   Basic Mobility Standardized Score 57.68   Highest Level Of Mobility   JH-HLM Goal 8: Walk 250 feet or more   JH-HLM Achieved 8: Walk 250 feet ot more   Modified [place] Scale   Modified [place] Scale 1   Barthel Index   Feeding 10 Bathing 5   Grooming Score 5   Dressing Score 10   Bladder Score 10   Bowels Score 10   Toilet Use Score 10   Transfers (Bed/Chair) Score 15   Mobility (Level Surface) Score 15   Stairs Score 10   Barthel Index Score 100     PT Evaluation Time: 4313-7613  Gladis Olivares, PT, DPT

## 2023-11-01 NOTE — DISCHARGE INSTRUCTIONS
Stroke   WHAT YOU NEED TO KNOW:   A stroke happens when blood flow to part of the brain is stopped. This can cause serious brain damage from a lack of oxygen. Your healthcare providers will help you create goals for your recovery. They will help you and your family or care providers make a plan for care at home to help you reach your goals. The plan will include lifestyle changes you can make to help you manage your health and prevent another stroke. Your discharge instructions will include information on services and equipment you or your family may need. DISCHARGE INSTRUCTIONS:   Call your local emergency number (912 in the [address]) or have someone call if:   You have any of the following signs of a stroke:      Numbness or drooping on one side of your face     Weakness in an arm or leg    Confusion or difficulty speaking    Dizziness, a severe headache, or vision loss       You have a seizure. You have chest pain or shortness of breath. Seek care immediately if:   Your arm or leg feels warm, tender, and painful. It may look swollen and red. You have loss of balance or coordination. You have double vision or vision loss. You have unusual or heavy bleeding. Call your doctor or neurologist if:   Your blood sugar level or blood pressure is higher or lower than usual.    You have trouble swallowing. You have trouble having a bowel movement or urinating. You have questions or concerns about your condition or care. Medicines:  Medicines may be needed to treat high cholesterol, high blood pressure, or diabetes. You may also need any of the following, depending on the kind of stroke you had: Antiplatelets , such as aspirin, help prevent blood clots. Take your antiplatelet medicine exactly as directed. These medicines make it more likely for you to bleed or bruise. If you are told to take aspirin, do not take acetaminophen or ibuprofen instead. Blood thinners  help prevent blood clots. Clots can cause strokes, heart attacks, and death. Many types of blood thinners are available. Your healthcare provider will give you specific instructions for the type you are given. The following are general safety guidelines to follow while you are taking a blood thinner:    Watch for bleeding and bruising. Watch for bleeding from your gums or nose. Watch for blood in your urine and bowel movements. Use a soft washcloth on your skin, and a soft toothbrush to brush your teeth. This can keep your skin and gums from bleeding. If you shave, use an electric shaver. Do not play contact sports. Tell your dentist and other healthcare providers that you take a blood thinner. Wear a bracelet or necklace that says you take this medicine. Do not start or stop any other medicines or supplements unless your healthcare provider tells you to. Many medicines and supplements cannot be used with blood thinners. Take your blood thinner exactly as prescribed by your healthcare provider. Do not skip does or take less than prescribed. Tell your provider right away if you forget to take your blood thinner, or if you take too much. Take your medicine as directed. Contact your healthcare provider if you think your medicine is not helping or if you have side effects. Tell your provider if you are allergic to any medicine. Keep a list of the medicines, vitamins, and herbs you take. Include the amounts, and when and why you take them. Bring the list or the pill bottles to follow-up visits. Carry your medicine list with you in case of an emergency. Know the warning signs of a stroke:   The words BE FAST can help you remember and recognize warning signs of a stroke:  B = Balance:  Sudden loss of balance    E = Eyes:  Loss of vision in one or both eyes    F = Face:  Face droops on one side    A = Arms:  Arm drops when both arms are raised    S = Speech:  Speech is slurred or sounds different    T = Time:  Time to get help immediately       Recovery testing: Your healthcare provider will test your recovery 90 days (3 months) after your stroke. This may be done over the phone or in person. Your provider will ask how well you can do the activities you did before the stroke. He or she will also ask how well you can do your daily activities without help. Your provider may make recommendations for you based on your test. For example, you may need someone to help you walk safely. You may also need help with daily activities, such as getting dressed. Based on your answers, your provider may do this test again over time. Manage the effects of a stroke:   Go to stroke rehabilitation (rehab) if directed. Rehab is a program run by specialists who will help you recover abilities you may have lost. Specialists include physical, occupational, and speech therapists. Physical therapists help you gain strength or keep your balance. Occupational therapists teach you new ways to do daily activities. Your therapy may include movements for everyday activities. An example is being able to raise yourself from a chair. A speech therapist helps you improve your ability to talk and swallow. Make your home safe. Remove anything you might trip over. Tape electrical cords down. Keep paths clear throughout your home. Make sure your home is well lit. Put nonslip materials on surfaces that might be slippery. An example is your bathtub or shower floor. A cane or walker may help you keep your balance as you walk. Prevent another stroke:   Manage health conditions. A condition such as diabetes can increase your risk for a stroke. Control your blood sugar level if you have hyperglycemia or diabetes. Take your prescribed medicines and check your blood sugar level as directed. Check your blood pressure as directed. High blood pressure can increase your risk for a stroke.  Follow your healthcare provider's directions for controlling your blood pressure. Do not use nicotine products or illegal drugs. Nicotine and other chemicals in cigarettes and cigars can cause blood vessel damage. Nicotine and illegal drugs both increase your risk for a stroke. Ask your healthcare provider for information if you currently smoke or use drugs and need help to quit. E- cigarettes or smokeless tobacco still contain nicotine. Talk to your healthcare provider before you use these products. Talk to your healthcare provider about alcohol. Alcohol can raise your blood pressure. The recommended limit is 2 drinks in a day for men and 1 drink in a day for women. Do not binge drink or save a week's worth of alcohol to drink in 1 or 2 days. Limit weekly amounts as directed by your provider. Eat a variety of healthy foods. Healthy foods include whole-grain breads, low-fat dairy products, beans, lean meats, and fish. Eat at least 5 servings of fruits and vegetables each day. Choose foods that are low in fat, cholesterol, salt, and sugar. Eat foods that are high in potassium, such as potatoes and bananas. A dietitian can help you create healthy meal plans. Maintain a healthy weight. Ask your healthcare provider what a healthy weight is for you. Ask him or her to help you create a weight loss plan, if needed. He or she can help you create small goals if you have a lot of weight to lose. Exercise as directed. Exercise can lower your blood pressure, cholesterol, weight, and blood sugar levels. Healthcare providers will help you create exercise goals. They can also help you make a plan to reach your goals. For example, you can break exercise into 10 minute periods, 3 times in the day. Find an exercise that you enjoy. This will make it easier for you to reach your exercise goals. Manage stress. Stress can raise your blood pressure. Find new ways to relax, such as deep breathing or listening to music.     What you need to know about depression after a stroke:  Talk to your healthcare provider if you have depression that continues or is getting worse. Your provider may be able to help treat your depression. Your provider can also recommend support groups for you to join. A support group is a place to talk with others who have had a stroke. It may also help to talk to friends and family members about how you are feeling. Tell your family and friends to let your healthcare provider know if they see any signs of depression:  Extreme sadness    Avoiding social interaction with family or friends    A lack of interest in things you once enjoyed    Irritability    Trouble sleeping    Low energy levels    A change in eating habits or sudden weight gain or loss    Follow up with your doctor or neurologist as directed: You may need to come in for regular tests of your brain function. Your provider may refer you to a specialist, or to other kinds of care. An example is palliative (comfort) care. Your provider can also give information about respite care to anyone who helps care for you. Respite care is a service to help caregivers take a break or get more rest. Write down your questions so you remember to ask them during your visits. For support and more information:   American Heart Association and American Stroke Association  [address] [place] [address] , [address] [place] [address]  Phone: 5- 193 - [phone]  Web Address: https://www.[employer]/. org    © Copyright [employer] 2023 Information is for End User's use only and may not be sold, redistributed or otherwise used for commercial purposes. The above information is an  only. It is not intended as medical advice for individual conditions or treatments. Talk to your doctor, nurse or pharmacist before following any medical regimen to see if it is safe and effective for you.

## 2023-11-01 NOTE — PLAN OF CARE
Problem: Potential for Falls  Goal: Patient will remain free of falls  Description: INTERVENTIONS:  - Educate patient/family on patient safety including physical limitations  - Instruct patient to call for assistance with activity   - Consult OT/PT to assist with strengthening/mobility   - Keep Call bell within reach  - Keep bed low and locked with side rails adjusted as appropriate  - Keep care items and personal belongings within reach  - Initiate and maintain comfort rounds  - Make Fall Risk Sign visible to staff  - Offer Toileting every 2 Hours, in advance of need  - Initiate/Maintain bed alarm  - Obtain necessary fall risk management equipment:   - Apply yellow socks and bracelet for high fall risk patients  - Consider moving patient to room near nurses station  Outcome: Progressing     Problem: PAIN - ADULT  Goal: Verbalizes/displays adequate comfort level or baseline comfort level  Description: Interventions:  - Encourage patient to monitor pain and request assistance  - Assess pain using appropriate pain scale  - Administer analgesics based on type and severity of pain and evaluate response  - Implement non-pharmacological measures as appropriate and evaluate response  - Consider cultural and social influences on pain and pain management  - Notify physician/advanced practitioner if interventions unsuccessful or patient reports new pain  Outcome: Progressing     Problem: SAFETY ADULT  Goal: Patient will remain free of falls  Description: INTERVENTIONS:  - Educate patient/family on patient safety including physical limitations  - Instruct patient to call for assistance with activity   - Consult OT/PT to assist with strengthening/mobility   - Keep Call bell within reach  - Keep bed low and locked with side rails adjusted as appropriate  - Keep care items and personal belongings within reach  - Initiate and maintain comfort rounds  - Make Fall Risk Sign visible to staff  - Offer Toileting every 2 Hours, in advance of need  - Initiate/Maintain bed alarm  - Obtain necessary fall risk management equipment:   - Apply yellow socks and bracelet for high fall risk patients  - Consider moving patient to room near nurses station  Outcome: Progressing  Goal: Maintain or return to baseline ADL function  Description: INTERVENTIONS:  -  Assess patient's ability to carry out ADLs; assess patient's baseline for ADL function and identify physical deficits which impact ability to perform ADLs (bathing, care of mouth/teeth, toileting, grooming, dressing, etc.)  - Assess/evaluate cause of self-care deficits   - Assess range of motion  - Assess patient's mobility; develop plan if impaired  - Assess patient's need for assistive devices and provide as appropriate  - Encourage maximum independence but intervene and supervise when necessary  - Involve family in performance of ADLs  - Assess for home care needs following discharge   - Consider OT consult to assist with ADL evaluation and planning for discharge  - Provide patient education as appropriate  Outcome: Progressing  Goal: Maintains/Returns to pre admission functional level  Description: INTERVENTIONS:  - Perform BMAT or MOVE assessment daily.   - Set and communicate daily mobility goal to care team and patient/family/caregiver. - Collaborate with rehabilitation services on mobility goals if consulted  - Perform Range of Motion 3 times a day. - Reposition patient every 2 hours.   - Dangle patient 3 times a day  - Stand patient 3 times a day  - Ambulate patient 3 times a day  - Out of bed to chair 3 times a day   - Out of bed for meals 3 times a day  - Out of bed for toileting  - Record patient progress and toleration of activity level   Outcome: Progressing     Problem: DISCHARGE PLANNING  Goal: Discharge to home or other facility with appropriate resources  Description: INTERVENTIONS:  - Identify barriers to discharge w/patient and caregiver  - Arrange for needed discharge resources and transportation as appropriate  - Identify discharge learning needs (meds, wound care, etc.)  - Arrange for interpretive services to assist at discharge as needed  - Refer to Case Management Department for coordinating discharge planning if the patient needs post-hospital services based on physician/advanced practitioner order or complex needs related to functional status, cognitive ability, or social support system  Outcome: Progressing     Problem: Knowledge Deficit  Goal: Patient/family/caregiver demonstrates understanding of disease process, treatment plan, medications, and discharge instructions  Description: Complete learning assessment and assess knowledge base. Interventions:  - Provide teaching at level of understanding  - Provide teaching via preferred learning methods  Outcome: Progressing     Problem: Neurological Deficit  Goal: Neurological status is stable or improving  Description: Interventions:  - Monitor and assess patient's level of consciousness, motor function, sensory function, and level of assistance needed for ADLs. - Monitor and report changes from baseline. Collaborate with interdisciplinary team to initiate plan and implement interventions as ordered. - Provide and maintain a safe environment. - Consider seizure precautions. - Consider fall precautions. - Consider aspiration precautions. - Consider bleeding precautions. Outcome: Progressing     Problem: Activity Intolerance/Impaired Mobility  Goal: Mobility/activity is maintained at optimum level for patient  Description: Interventions:  - Assess and monitor patient  barriers to mobility and need for assistive/adaptive devices. - Assess patient's emotional response to limitations. - Collaborate with interdisciplinary team and initiate plans and interventions as ordered. - Encourage independent activity per ability.  - Maintain proper body alignment. - Perform active/passive rom as tolerated/ordered.   - Plan activities to conserve energy.  - Turn patient as appropriate  Outcome: Progressing     Problem: Communication Impairment  Goal: Ability to express needs and understand communication  Description: Assess patient's communication skills and ability to understand information. Patient will demonstrate use of effective communication techniques, alternative methods of communication and understanding even if not able to speak. - Encourage communication and provide alternate methods of communication as needed. - Collaborate with case management/ for discharge needs. - Include patient/family/caregiver in decisions related to communication. Outcome: Progressing     Problem: Nutrition  Goal: Nutrition/Hydration status is improving  Description: Monitor and assess patient's nutrition/hydration status for malnutrition (ex- brittle hair, bruises, dry skin, pale skin and conjunctiva, muscle wasting, smooth red tongue, and disorientation). Collaborate with interdisciplinary team and initiate plan and interventions as ordered. Monitor patient's weight and dietary intake as ordered or per policy. Utilize nutrition screening tool and intervene per policy. Determine patient's food preferences and provide high-protein, high-caloric foods as appropriate. - Assist patient with eating.  - Allow adequate time for meals.  - Encourage patient to take dietary supplement as ordered. - Collaborate with clinical nutritionist.  - Include patient/family/caregiver in decisions related to nutrition.   Outcome: Progressing

## 2023-11-01 NOTE — DISCHARGE SUMMARY
[address]  Discharge- Terri Guajardo 1965, 60 y.o. female MRN: [id number]  Unit/Bed#: -01 Encounter: [id number]  Primary Care Provider: Marco A Purcell MD   Date and time admitted to hospital: 10/31/2023  7:56 AM    * Hypertensive emergency  Assessment & Plan  Presents with significantly elevated /135, in setting of non adherence to antihypertensives associated with left sided weakness/numbness and facial droop    Due to concern for TIA/CVA, allow for permissive hypertension for 24 hrs  Monitor vitals per stroke protocol  IV hydralazine prn persistent elevations >158 systolic    Left facial numbness  Assessment & Plan  Presents c/o left facial numbness which began last night, appeared resolved upon awaking this morning but shortly thereafter noticed return of left facial numbness with associated left upper and lower extremity numbness and weakness and facial droop. [address] (10/31): No acute infarction or other intracranial abnormality. Low-lying cerebellar tonsils without meeting criteria for Chiari I malformation   CTA head/neck (10/31): limited due to contrast timing; no large vessel intracranial occlusion or hemodynamically significant stenosis. No extracranial carotid stenosis. The cervical vertebral arteries are patent  MRI brain (10/31): Subcentimeter subtle focus of abnormal signal within the right anterior thalamus on series 3 image 12 of diffusion weighted imaging described in detail above suspicious for small early lacunar infarction.      Stroke pathway initiated,  No tPA/TNK given due to initial onset >4.5 hrs ago, NIHSS 2  Neuro consult appreciated,  PT, OT, SLP per protocol   Neurochecks per protocol  Telemetry monitor unremarkable  Continue aspirin/plavix fro 21 days then monotherapy, continue atorvastatin, lisinopril  A1c 6.5%,     Abnormal cortisol level-resolved as of 11/1/2023  Assessment & Plan  Noted history of questionable low cortisol level; labs were checked in the afternoon when this was questioned   Most recent cortisol 14 in Feb '23  AM cortisol normal    Morbid obesity with BMI of 50.0-59.9, adult ([address])  Assessment & Plan  Body mass index is 55.54 kg/m².     Recommend incorporating a more whole foods plant-predominant diet along with decreasing consumption of red meats and processed foods  Per AHA guidelines, recommend moderate-vigorous intensity exercise for 30 minutes a day for 5 days a week or a total of 150 min/week    Type 2 diabetes mellitus with other specified complication [facility])  Assessment & Plan    Recent Labs     10/31/23  1100 10/31/23  1619 10/31/23  2016 11/01/23  0807   POCGLU 111 91 103 120         Blood Sugar Average: Last 72 hrs:  (P) 124  A1c 6.5%  Follow up with endocrinology     Nonadherence to medication  Assessment & Plan  Counseled on need to maintain all medications as prescribed to prevent adverse events     Stroke (cerebrum) (HCC)  Assessment & Plan  Confirmed by MRI  Will need outpatient neuro follow up  Suggest cardiac monitoring outpatient to rule out dysrhythmia       Medical Problems       Resolved Problems  Date Reviewed: 11/1/2023            Resolved    Abnormal cortisol level 11/1/2023     Resolved by  Angie Kelly PA-C        Discharging Physician / Practitioner: Angie Kelly PA-C  PCP: Sachin Rm MD  Admission Date:   Admission Orders (From admission, onward)       Ordered        10/31/23 0943  Place in Observation  Once                          Discharge Date: 11/01/23    Consultations During Hospital Stay:  IP CONSULT TO NEUROLOGY  IP CONSULT TO PHYSICAL MEDICINE REHAB  IP CONSULT TO CASE MANAGEMENT  IP CONSULT TO NUTRITION SERVICES   PT, OT, Speech     Procedures Performed:   None     Significant Findings / Test Results:   MRI brain wo contrast   Final Result by Gene Gustabo Cranker, DO (10/31 1212)      Subcentimeter subtle focus of abnormal signal within the right anterior thalamus on series 3 image 12 of diffusion weighted imaging described in detail above suspicious for small early lacunar infarction. This examination was marked "immediate notification" in Epic in order to begin the standard process by which the radiology reading room liaison alerts the referring practitioner. Workstation performed: [id number]         XR stroke alert portable chest   Final Result by Epifanio Tsang MD (10/31 1111)      No focal consolidation, pleural effusion, or pneumothorax. Workstation performed: [id number]         CTA stroke alert (head/neck)   Final Result by Fani Christy MD (10/31 0903)   The examination is limited due to contrast timing. 1. CTA head: Negative for large vessel intracranial occlusion or hemodynamically significant stenosis. 2. CTA neck:  No extracranial carotid stenosis. The cervical vertebral arteries are patent. Findings were directly discussed with Dr. Toi Pedraza at 9:00 a.m. Workstation performed: [id number]         CT stroke alert brain   Final Result by Fani Christy MD (10/31 0909)      No acute infarction or other intracranial abnormality. Low-lying cerebellar tonsils without meeting criteria for Chiari I malformation. Findings were directly discussed with Dr. Toi Pedraza at 9:00 a.m. Resident: Thai Cortez, the attending radiologist, have reviewed the images and agree with the final report above. Workstation performed: [id number]           Echo no valvular abnormalities or atrial enlargement, preserved EF     Incidental Findings:   None      Test Results Pending at Discharge (will require follow up):    None      Outpatient Tests Requested:  None     Complications:  none     Reason for Admission: stroke symptoms, hypertensive urgency     Hospital Course:   Lance Briceno is a 60 y.o. female patient who originally presented to the hospital on 10/31/2023 due to left sided paraesthesias and numbness. See full H&P for details. She is noncompliant with home lisinopril, states she prefers a more natural path for treatment. Unfortunately, presented with significant hypertension and MRI confirming small stroke in the thalamus. She was seen by neurology and loaded with aspirin and plavix. Will continue DAPT for 21 days, then monotherapy with aspirin alone. Aim for normotension. Will need outpatient followup, consideration for holter monitor. Hemodynamically stable at this time for discharge to home. All questions answered to the best of my ability at this time to patient/family satisfaction. Plan of care agreed upon by all. Please see above list of diagnoses and related plan for additional information. Condition at Discharge: good    Discharge Day Visit / Exam:   * Please refer to separate progress note for these details *    Discussion with Family: Patient declined call to . Discharge instructions/Information to patient and family:   See after visit summary for information provided to patient and family. Provisions for Follow-Up Care:  See after visit summary for information related to follow-up care and any pertinent home health orders. Disposition:   Home    Planned Readmission: no     Discharge Statement:  I spent 36 minutes discharging the patient. This time was spent on the day of discharge. I had direct contact with the patient on the day of discharge. Greater than 50% of the total time was spent examining patient, answering all patient questions, arranging and discussing plan of care with patient as well as directly providing post-discharge instructions. Additional time then spent on discharge activities. Discharge Medications:  See after visit summary for reconciled discharge medications provided to patient and/or family.       **Please Note: This note may have been constructed using a voice recognition system**

## 2023-11-01 NOTE — PROGRESS NOTES
[address]  Progress Note  Name: Rustam Mcdermott  MRN: [id number]  Unit/Bed#: -01 I Date of Admission: 10/31/2023   Date of Service: 11/1/2023 I Hospital Day: 0    Assessment/Plan   * Hypertensive emergency  Assessment & Plan  Presents with significantly elevated /135, in setting of non adherence to antihypertensives associated with left sided weakness/numbness and facial droop    Due to concern for TIA/CVA, allow for permissive hypertension for 24 hrs  Monitor vitals per stroke protocol  IV hydralazine prn persistent elevations >414 systolic    Left facial numbness  Assessment & Plan  Presents c/o left facial numbness which began last night, appeared resolved upon awaking this morning but shortly thereafter noticed return of left facial numbness with associated left upper and lower extremity numbness and weakness and facial droop. [address] (10/31): No acute infarction or other intracranial abnormality. Low-lying cerebellar tonsils without meeting criteria for Chiari I malformation   CTA head/neck (10/31): limited due to contrast timing; no large vessel intracranial occlusion or hemodynamically significant stenosis. No extracranial carotid stenosis. The cervical vertebral arteries are patent  MRI brain (10/31): Subcentimeter subtle focus of abnormal signal within the right anterior thalamus on series 3 image 12 of diffusion weighted imaging described in detail above suspicious for small early lacunar infarction.      Stroke pathway initiated,  No tPA/TNK given due to initial onset >4.5 hrs ago, NIHSS 2  Neuro consult appreciated,  PT, OT, SLP consults   Neurochecks per protocol  Telemetry monitor   Continue aspirin/plavix fro 21 days then monotherapy, continue statin  A1c 6.5%,   Stat CT for any acute neuro changes  Lovenox for DVT prophylaxis      Abnormal cortisol level-resolved as of 11/1/2023  Assessment & Plan  Noted history of questionable low cortisol level; labs were checked in the afternoon when this was questioned   Most recent cortisol 14 in Feb '23  AM cortisol normal    Morbid obesity with BMI of 50.0-59.9, adult ([address])  Assessment & Plan  Body mass index is 55.54 kg/m². Recommend incorporating a more whole foods plant-predominant diet along with decreasing consumption of red meats and processed foods  Per AHA guidelines, recommend moderate-vigorous intensity exercise for 30 minutes a day for 5 days a week or a total of 150 min/week    Type 2 diabetes mellitus with other specified complication [facility])  Assessment & Plan    Recent Labs     10/31/23  1100 10/31/23  1619 10/31/23  2016 11/01/23  0807   POCGLU 111 91 103 120         Blood Sugar Average: Last 72 hrs:  (P) 124  A1c 6.5%  Hold GLP-1, recently started by endocrinologist but unfortunately not covered by insurance   Continue SSI # 4 (weight based dose)  Diabetic diet level 3  Blood glucose monitoring ACHS  Hypoglycemia protocol  Adjust as needed       Nonadherence to medication  Assessment & Plan  Counseled on need to maintain all medications as prescribed to prevent adverse events     Stroke (cerebrum) [facility])  Assessment & Plan  Confirmed by MRI  Will need outpatient neuro follow up  Suggest cardiac monitoring outpatient to rule out dysrhythmia            VTE Pharmacologic Prophylaxis: VTE Score: 9 High Risk (Score >/= 5) - Pharmacological DVT Prophylaxis Ordered: enoxaparin (Lovenox). Sequential Compression Devices Ordered. Patient Centered Rounds: I performed bedside rounds with nursing staff today. Discussions with Specialists or Other Care Team Provider: neuro, CM     Education and Discussions with Family / Patient: Patient declined call to . Total Time Spent on Date of Encounter in care of patient: 35 mins.  This time was spent on one or more of the following: performing physical exam; counseling and coordination of care; obtaining or reviewing history; documenting in the medical record; reviewing/ordering tests, medications or procedures; communicating with other healthcare professionals and discussing with patient's family/caregivers. Current Length of Stay: 0 day(s)  Current Patient Status: Observation   Certification Statement: The patient, admitted on an observation basis, will now require > 2 midnight hospital stay due to pending echo results, if negative will dishcarge home today   Discharge Plan: Anticipate discharge later today or tomorrow to home. Code Status: Level 1 - Full Code    Subjective:   Patient reports complete resolution of all symptoms, no new symptoms overnight. Objective:     Vitals:   Temp (24hrs), Av.4 °F (36.9 °C), Min:98 °F (36.7 °C), Max:99 °F (37.2 °C)    Temp:  [98 °F (36.7 °C)-99 °F (37.2 °C)] 98 °F (36.7 °C)  HR:  [70-85] 70  Resp:  [16-26] 18  BP: (109-178)/(58-85) 139/70  SpO2:  [93 %-97 %] 97 %  Body mass index is 55.54 kg/m². Input and Output Summary (last 24 hours): Intake/Output Summary (Last 24 hours) at 2023 0941  Last data filed at 10/31/2023 1201  Gross per 24 hour   Intake 200 ml   Output --   Net 200 ml       Physical Exam:   Physical Exam  Vitals and nursing note reviewed. Constitutional:       General: She is awake. Appearance: Normal appearance. She is well-developed and well-groomed. She is morbidly obese. She is not ill-appearing or toxic-appearing. Cardiovascular:      Rate and Rhythm: Normal rate and regular rhythm. Comments: Tele reviewed  Pulmonary:      Effort: Pulmonary effort is normal. No respiratory distress. Skin:     Coloration: Skin is not pale. Neurological:      General: No focal deficit present. Mental Status: She is alert and oriented to person, place, and time. Psychiatric:         Mood and Affect: Mood normal.         Behavior: Behavior normal. Behavior is cooperative.            Additional Data:     Labs:  Results from last 7 days   Lab Units 23  0528   WBC Thousand/uL 6.09   HEMOGLOBIN g/dL 13.5   HEMATOCRIT % 40.8   PLATELETS Thousands/uL 230     Results from last 7 days   Lab Units 11/01/23  0528   SODIUM mmol/L 137   POTASSIUM mmol/L 3.6   CHLORIDE mmol/L 105   CO2 mmol/L 25   BUN mg/dL 13   CREATININE mg/dL 0.69   ANION GAP mmol/L 7   CALCIUM mg/dL 8.9   ALBUMIN g/dL 3.7   TOTAL BILIRUBIN mg/dL 0.52   ALK PHOS U/L 57   ALT U/L 17   AST U/L 14   GLUCOSE RANDOM mg/dL 130     Results from last 7 days   Lab Units 10/31/23  0809   INR  1.01     Results from last 7 days   Lab Units 11/01/23  0807 10/31/23  2016 10/31/23  1619 10/31/23  1100 10/31/23  0807   POC GLUCOSE mg/dl 120 103 91 111 195*     Results from last 7 days   Lab Units 11/01/23  0528   HEMOGLOBIN A1C % 6.5*           Lines/Drains:  Invasive Devices       Peripheral Intravenous Line  Duration             Peripheral IV 10/31/23 Left Antecubital 1 day                      Telemetry:  Telemetry Orders (From admission, onward)               24 Hour Telemetry Monitoring  Continuous x 24 Hours (Telem)        Expiring   Question:  Reason for 24 Hour Telemetry  Answer:  TIA/Suspected CVA/ Confirmed CVA                     Telemetry Reviewed: Normal Sinus Rhythm  Indication for Continued Telemetry Use: Acute CVA             Imaging: Reviewed radiology reports from this admission including: MRI brain    Recent Cultures (last 7 days):         Last 24 Hours Medication List:   Current Facility-Administered Medications   Medication Dose Route Frequency Provider Last Rate    acetaminophen  650 mg Oral Q6H PRN Amaya Valiente PA-C      aluminum-magnesium hydroxide-simethicone  30 mL Oral Q6H PRN Yarely Valiente PA-C      aspirin  81 mg Oral Daily Yarely Valiente PA-C      atorvastatin  40 mg Oral QPM Yarely Valiente PA-C      clopidogrel  75 mg Oral Daily Horace ZAPIEN MD      enoxaparin  60 mg Subcutaneous Q12H [facility] Yarely Valiente PA-C      insulin lispro  2-12 Units Subcutaneous TID ABDIRAHMAN Ordaz PA-C insulin lispro  2-12 Units Subcutaneous HS Yarely Valiente PA-C      ondansetron  4 mg Intravenous Q6H PRN Yarely Valiente PA-C      polyethylene glycol  17 g Oral Daily PRN Sigrid Bardales PA-C          Today, Patient Was Seen By: Sigrid Bardales PA-C    **Please Note: This note may have been constructed using a voice recognition system. **

## 2023-11-01 NOTE — OCCUPATIONAL THERAPY NOTE
Occupational Therapy Evaluation        Patient Name: Dylon BATES Date: 11/1/2023 11/01/23 0730   OT Last Visit   OT Visit Date 11/01/23   Note Type   Note type Evaluation   Pain Assessment   Pain Assessment Tool 0-10   Pain Score 5   Pain Location/Orientation Orientation: Bilateral;Location: Shoulder   Pain Onset/Description Onset: Ongoing   Hospital Pain Intervention(s) Repositioned; Ambulation/increased activity   Restrictions/Precautions   Weight Bearing Precautions Per Order No   Braces or Orthoses Other (Comment)  (none per patient)   Other Precautions Telemetry;Pain   Home Living   Type of [address] [place] [address] Two level;Stairs to enter with rails;Bed/bath upstairs  (5 [address]AY; 8+6 steps to 2nd floor)   Bathroom Shower/Tub Walk-in shower   Bathroom Toilet Standard   Bathroom Equipment Other (Comment)  (none per patient)   [address] Other (Comment)  (none reported)   Additional Comments Pt ambulates without an AD. Prior Function   Level of [place] Independent with functional mobility; Independent with ADLs; Independent with IADLS   Lives With Spouse; Family   Receives Help From Family   IADLs Independent with driving; Independent with meal prep; Independent with medication management   Falls in the last 6 months 0   Vocational Other (Comment)  (Homemaker)   Lifestyle   Autonomy Patient lives with family in a 2 story house, 5 FAY; 8+6 steps to 2nd floor. Patient was independnet with ADLs/ IADLs and ambulatory without AD.    Reciprocal Relationships Supportive Family   Service to Others Homemaker   General   Family/Caregiver Present No   ADL   Eating Assistance 7  Independent   Grooming Assistance 6  Modified Independent   UB Bathing Assistance 6  Modified Independent   LB Bathing Assistance 6  Modified Independent   UB Dressing Assistance 6  Modified independent   LB Dressing Assistance 6  Modified independent   [address]  6 Modified independent   Functional Assistance 6  Modified independent   Bed Mobility   Supine to Sit 6  Modified independent   Additional items HOB elevated; Bedrails; Increased time required   Transfers   Sit to Stand 7  Independent   Stand to Sit 7  Independent   Functional Mobility   Functional Mobility 7  Independent   Additional Comments no AD   Balance   Static Sitting Normal   Dynamic Sitting Good   Static Standing Good   Dynamic Standing Good   Ambulatory Good   Activity Tolerance   Activity Tolerance Patient tolerated treatment well   RUE Assessment   RUE Assessment WFL   LUE Assessment   LUE Assessment WFL   Hand Function   Gross Motor Coordination Functional   Fine Motor Coordination Functional   Sensation   Light Touch No apparent deficits  (BUEs)   Proprioception   Proprioception No apparent deficits  (BUEs)   Vision-Basic Assessment   Current Vision Wears glasses only for reading   Psychosocial   Psychosocial (WDL) WDL   Perception   Inattention/Neglect Appears intact   Cognition   Overall Cognitive Status WFL   Arousal/Participation Alert; Responsive; Cooperative   Attention Within functional limits   Orientation Level Oriented X4   Memory Within functional limits   Following Commands Follows all commands and directions without difficulty   Assessment   Assessment Patient is a 60 y.o. female seen for OT evaluation s/p admit to [facility] on 10/31/2023 w/Stroke (cerebrum) [facility]). Commorbidities affecting patient's functional performance at time of assessment include:  hypertensive emergency, morbid obesity, left facial numbness, non-adherence to medication, and type 2 DM. Orders placed for OT evaluation and treatment. Performed at least two patient identifiers during session including name and wristband. Prior to admission,  Patient lives with family in a 2 story house, 5 FAY; 8+6 steps to 2nd floor. Patient was independnet with ADLs/ IADLs and ambulatory without AD.  Upon evaluation, patient requires independent assist for UB ADLs, modified independent assist for LB ADLs, transfers and functional ambulation in room and bathroom with independent assist, without the use of an AD. Ritchie Vidal Presents with functional use of BUEs, with intact prehension, coordination and symmetrical muscle strength. Patient appears to be functioning at baseline. No further acute OT needs identified at this time to warrant continuation of services. D/C OT services. From OT standpoint, recommendation at time of d/c would be home with family support. Discharge Recommendation   Rehab Resource Intensity Level, OT No post-acute rehabilitation needs  (patient requested a referral for OP lymphadema specialist ( PT aware))   AM-PAC Daily Activity Inpatient   Lower Body Dressing 3   Bathing 3   Toileting 4   Upper Body Dressing 4   Grooming 4   Eating 4   Daily Activity Raw Score 22   Daily Activity Standardized Score (Calc for Raw Score >=11) 47. 1   AM-PAC Applied Cognition Inpatient   Following a Speech/Presentation 4   Understanding Ordinary Conversation 4   Taking Medications 4   Remembering Where Things Are Placed or Put Away 4   Remembering List of 4-5 Errands 4   Taking Care of Complicated Tasks 4   Applied Cognition Raw Score 24   Applied Cognition Standardized Score 62.21   Barthel Index   Feeding 10   Bathing 5   Grooming Score 5   Dressing Score 10   Bladder Score 10   Bowels Score 10   Toilet Use Score 10   Transfers (Bed/Chair) Score 15   Mobility (Level Surface) Score 15   Stairs Score 10   Barthel Index Score 100

## 2023-11-01 NOTE — PROGRESS NOTES
Progress Note - Neurology   [facility] Page 60 y.o. female [id number]  Unit/Bed#: /-01    Assessment:    Stroke (cerebrum) [facility])  Assessment & Plan  65-year-old female with diabetes, hypertension, obesity, who presented with strokelike symptoms. Patient reported left facial numbness and left sided weakness/gait instability. Stroke alert initiated in the ED. BP on presentation 226/135. NIHSS 2-left lower extremity drift and decreased sensation in left face/left lower extremity. Patient was deemed not a TNK candidate due to not being in the appropriate time window and resolving symptoms. MRI brain demonstrated R thalamic infarct. Suspect most likely small vessel etiology in th setting of uncontrolled vascular risk factors. Plan:  - Stroke pathway  Echo  S/p aspirin 324 mg and Plavix 300 mg x 1, continue aspirin 81 mg and Plavix 75 mg x 21 days then transition to aspirin monotherapy  Atorvastatin 40 mg  Goal normontension and avoid hypotension. Modification of vascular risk factors per primary team  Continue telemetry  PT/OT/ST  Frequent neuro checks. Continue to monitor and notify neurology with any changes. - Medical management and supportive care per primary team. Correction of any metabolic or infectious disturbances. - If echo unremarkable, then no further inpatient neurology recommendations. Please call with any questions. Results:  - CT head:  No acute infarction or other intracranial abnormality. Low-lying cerebellar tonsils without meeting criteria for Chiari I malformation.  - CTA head and neck:  1. CTA head: Negative for large vessel intracranial occlusion or hemodynamically significant stenosis. 2. CTA neck:  No extracranial carotid stenosis. The cervical vertebral arteries are patent.   - MRI brain: Subcentimeter subtle focus of abnormal signal within the right anterior thalamus on series 3 image 12 of diffusion weighted imaging described in detail above suspicious for small early lacunar infarction.  - , A1c 6.5    Type 2 diabetes mellitus with other specified complication [facility])  Assessment & Plan  Lab Results   Component Value Date    HGBA1C 6.5 (H) 11/01/2023       Recent Labs     10/31/23  1100 10/31/23  1619 10/31/23  2016 11/01/23  0807   POCGLU 111 91 103 120       Blood Sugar Average: Last 72 hrs:  (P) 124      - Medical management per primary team    * Hypertensive emergency  Assessment & Plan  - BP on presentation 226/135  - Medical management per primary team       Teri Rudd will need follow up in in 6 weeks with neurovascular attending/AP . She will not require outpatient neurological testing. Case and treatment plan reviewed with attending neurologist, Dr. Claudell Paschal. Please see attending attestation for any further recommendations. Subjective:   Patient seen and evaluated at the bedside with attending neurologist.  present as well. Patient sitting in chair and reports she is doing well. She denies any new complaints at this time. She was asking if she is able to continue to take her natural supplements at home in addition to the aspirin.         Past Medical History:   Diagnosis Date    Diabetes type 2, controlled ([address])     Hypertension     No official diagnosis    Obesity      Past Surgical History:   Procedure Laterality Date    CARPAL TUNNEL RELEASE      COLONOSCOPY      CYST REMOVAL      NO PAST SURGERIES       Family History   Problem Relation Age of Onset    Diabetes Mother     Stroke Father     Hypertension Father     Cancer Neg Hx     Heart disease Neg Hx     Thyroid disease Neg Hx     Breast cancer Neg Hx     Ovarian cancer Neg Hx     Uterine cancer Neg Hx     Cervical cancer Neg Hx      Social History     Socioeconomic History    Marital status: Unknown     Spouse name: None    Number of children: None    Years of education: None    Highest education level: None   Occupational History    None   Tobacco Use    Smoking status: Never Smokeless tobacco: Never   Vaping Use    Vaping Use: Never used   Substance and Sexual Activity    Alcohol use: Yes     Comment: occasional    Drug use: No    Sexual activity: Not Currently   Other Topics Concern    None   Social History Narrative    None     Social Determinants of Health     Financial Resource Strain: Not on file   Food Insecurity: Not on file   Transportation Needs: Not on file   Physical Activity: Not on file   Stress: Not on file   Social Connections: Not on file   Intimate Partner Violence: Not on file   Housing Stability: Not on file         Medications: All current active meds have been reviewed and current meds:  Scheduled Meds:  Current Facility-Administered Medications   Medication Dose Route Frequency Provider Last Rate    acetaminophen  650 mg Oral Q6H PRN Susa Tima, PA-C      aluminum-magnesium hydroxide-simethicone  30 mL Oral Q6H PRN Arpita Alfred, PA-C      aspirin  81 mg Oral Daily Yarelyankit Valiente, PA-C      atorvastatin  40 mg Oral QPM Yarely Valiente, PA-C      clopidogrel  75 mg Oral Daily Horace ZAPIEN MD      enoxaparin  60 mg Subcutaneous Q12H [address] Yarely Valiente, PA-C      insulin lispro  2-12 Units Subcutaneous TID AC Yarelyankit Valiente, PA-C      insulin lispro  2-12 Units Subcutaneous HS Yarely Valiente, PA-C      ondansetron  4 mg Intravenous Q6H PRN Yarelyankit Valiente, PA-C      polyethylene glycol  17 g Oral Daily PRN Yarelyankit Valiente, PA-C       Continuous Infusions:   PRN Meds:.  acetaminophen    aluminum-magnesium hydroxide-simethicone    ondansetron    polyethylene glycol       ROS:   A 12 system ROS was completed. Other than the above mentioned complaints in the HPI and those commented on below, all remaining systems were negative. Vitals:   /70 (BP Location: Right arm) Comment: Simultaneous filing. User may not have seen previous data. Pulse 70 Comment: Simultaneous filing. User may not have seen previous data.   Temp 98 °F (36.7 °C) (Oral) Comment: Simultaneous filing. User may not have seen previous data. Resp 18 Comment: Simultaneous filing. User may not have seen previous data. Ht 5' (1.524 m)   Wt 129 kg (284 lb 6.3 oz)   SpO2 97% Comment: Simultaneous filing. User may not have seen previous data. BMI 55.54 kg/m²       Physical and neurologic exam performed by attending neurologist:  Physical Exam:   Vital signs and nursing notes reviewed. Constitutional: Appears comfortable. Well developed/well nourished. No diaphoresis. No acute distress. Obese. HENT: Normocephalic, atraumatic. B/L external ears and nose appears normal. Oropharynx clear and moist.   Eyes: EOMs intact without nystagmus. No scleral icterus or injection. No discharge. Neck: Supple, normal ROM. No stridor noted. Cardiovascular: Regular rate. Pulmonary: No respiratory distress. Effort normal. No stridor or wheezing evident. Musculoskeletal: Normal ROM. No tenderness or deformities noted. Neurological: Detailed below. Skin: Warm and dry. No erythema or rashes. No jaundice. Psychiatric: Normal mood and affect, normal thought process/thought content. No hallucinations. Good insight. Neurologic Exam:  Mental Status: Patient is awake and alert. Oriented x 3. Follows all commands without difficulty. No dysarthria. No aphasia. Cranial Nerves: Cranial nerves 2-12 intact. Motor: 5/5 strength throughout bilateral upper and lower extremities. Sensation: Sensation to pinprick intact throughout. No extinction noted. No tremors noted. Gait: Deferred       Labs: I have personally reviewed pertinent reports.    Recent Results (from the past 24 hour(s))   Fingerstick Glucose (POCT)    Collection Time: 10/31/23 11:00 AM   Result Value Ref Range    POC Glucose 111 65 - 140 mg/dl   HS Troponin I 4hr    Collection Time: 10/31/23 11:04 AM   Result Value Ref Range    hs TnI 4hr 5 "Refer to ACS Flowchart"- see link ng/L    Delta 4hr hsTnI 1 <20 ng/L   HS Troponin I 2hr Collection Time: 10/31/23 12:16 PM   Result Value Ref Range    hs TnI 2hr 6 "Refer to ACS Flowchart"- see link ng/L    Delta 2hr hsTnI 2 <20 ng/L   Fingerstick Glucose (POCT)    Collection Time: 10/31/23  4:19 PM   Result Value Ref Range    POC Glucose 91 65 - 140 mg/dl   Fingerstick Glucose (POCT)    Collection Time: 10/31/23  8:16 PM   Result Value Ref Range    POC Glucose 103 65 - 140 mg/dl   Lipid Panel with Direct LDL reflex    Collection Time: 11/01/23  5:28 AM   Result Value Ref Range    Cholesterol 196 See Comment mg/dL    Triglycerides 134 See Comment mg/dL    HDL, Direct 55 >=50 mg/dL    LDL Calculated 114 (H) 0 - 100 mg/dL   Comprehensive metabolic panel    Collection Time: 11/01/23  5:28 AM   Result Value Ref Range    Sodium 137 135 - 147 mmol/L    Potassium 3.6 3.5 - 5.3 mmol/L    Chloride 105 96 - 108 mmol/L    CO2 25 21 - 32 mmol/L    ANION GAP 7 mmol/L    BUN 13 5 - 25 mg/dL    Creatinine 0.69 0.60 - 1.30 mg/dL    Glucose 130 65 - 140 mg/dL    Glucose, Fasting 130 (H) 65 - 99 mg/dL    Calcium 8.9 8.4 - 10.2 mg/dL    AST 14 13 - 39 U/L    ALT 17 7 - 52 U/L    Alkaline Phosphatase 57 34 - 104 U/L    Total Protein 7.0 6.4 - 8.4 g/dL    Albumin 3.7 3.5 - 5.0 g/dL    Total Bilirubin 0.52 0.20 - 1.00 mg/dL    eGFR 95 ml/min/1.73sq m   Magnesium    Collection Time: 11/01/23  5:28 AM   Result Value Ref Range    Magnesium 2.1 1.9 - 2.7 mg/dL   CBC (With Platelets)    Collection Time: 11/01/23  5:28 AM   Result Value Ref Range    WBC 6.09 4.31 - 10.16 Thousand/uL    RBC 4.48 3.81 - 5.12 Million/uL    Hemoglobin 13.5 11.5 - 15.4 g/dL    Hematocrit 40.8 34.8 - 46.1 %    MCV 91 82 - 98 fL    MCH 30.1 26.8 - 34.3 pg    MCHC 33.1 31.4 - 37.4 g/dL    RDW 13.4 11.6 - 15.1 %    Platelets 955 423 - 377 Thousands/uL    MPV 9.6 8.9 - 12.7 fL   Hemoglobin A1c w/EAG Estimation    Collection Time: 11/01/23  5:28 AM   Result Value Ref Range    Hemoglobin A1C 6.5 (H) Normal 4.0-5.6%; PreDiabetic 5.7-6.4%;  Diabetic >=6.5%; Glycemic control for adults with diabetes <7.0% %     mg/dl   Fingerstick Glucose (POCT)    Collection Time: 11/01/23  8:07 AM   Result Value Ref Range    POC Glucose 120 65 - 140 mg/dl       Imaging: I have personally reviewed pertinent imaging in PACS, and I have personally reviewed PACS reports. EKG, Pathology, and Other Studies: I have personally reviewed pertinent reports. VTE Prophylaxis: Sequential compression device (Venodyne)  and Enoxaparin (Lovenox)        Total time spent today 33 minutes. Greater than 50% of total time was spent with the patient and / or family counseling and / or coordination of care. A description of the counseling / coordination of care: Patient seen and evaluated. Case reviewed with attending. Chart thoroughly reviewed including imaging and labs. Coordination of care with RN. Discussion of imaging, medications, and follow-up with patient.

## 2023-11-01 NOTE — CASE MANAGEMENT
Case Management Assessment & Discharge Planning Note    Patient name Efra Vallejo  Location [address] [facility] [id number] MRN [id number]  : 1965 Date 2023       Current Admission Date: 10/31/2023  Current Admission Diagnosis:Stroke (cerebrum) [facility])   Patient Active Problem List    Diagnosis Date Noted    Left facial numbness 10/31/2023    Nonadherence to medication 10/31/2023    Type 2 diabetes mellitus with other specified complication ([address])     Stroke (cerebrum) ([address]) 10/31/2023    Encounter for annual routine gynecological examination 2022    Morbid obesity with BMI of 50.0-59.9, adult ([address]) 10/03/2018    Morbidly obese ([address]) 02/15/2018    Hypertensive emergency 2017    Mixed hyperlipidemia 2017    Tubular adenoma of colon 2017      LOS (days): 0  Geometric Mean LOS (GMLOS) (days):   Days to GMLOS:     OBJECTIVE:   Current admission status: Observation  Referral Reason: Stroke    Preferred Pharmacy:   [address] [place] [address]  Phone: [phone] Fax: [phone]    Primary Care Provider: Zoltan Aponte MD  Primary Insurance: [facility]  Secondary Insurance:     ASSESSMENT:  [facility] Proxies    There are no active Health Care Proxies on file. Readmission Root Cause  30 Day Readmission: No    Patient Information  Admitted from[de-identified] Home  Mental Status: Alert  During Assessment patient was accompanied by: Not accompanied during assessment  Assessment information provided by[de-identified] Patient  Primary Caregiver: Self  Support Systems: Self, Spouse/significant other, Daughter  [place] of Residence: [address] [place] [address] do you live in?: [facility] entry access options.  Select all that apply.: Stairs  Number of steps to enter home.: 5  Type of Current Residence: 2 story home  Upon entering residence, is there a bedroom on the main floor (no further steps)?: No  A bedroom is located on the following floor levels of residence (select all that apply):: 2nd Floor  Upon entering residence, is there a bathroom on the main floor (no further steps)?: Yes  Number of steps to 2nd floor from main floor: 9 (9+9)  In the last 12 months, was there a time when you were not able to pay the mortgage or rent on time?: No  In the last 12 months, how many places have you lived?: 1  In the last 12 months, was there a time when you did not have a steady place to sleep or slept in a shelter (including now)?: No  Homeless/housing insecurity resource given?: N/A  Living Arrangements: Lives w/ Spouse/significant other, Lives w/ Daughter  Is patient a ?: No    Activities of Daily Living Prior to Admission  Functional Status: Independent  Completes ADLs independently?: Yes  Ambulates independently?: Yes  Does patient use assisted devices?: No  Does patient currently own DME?: No  Does patient have a history of Outpatient Therapy (PT/OT)?: No  Does the patient have a history of Short-Term Rehab?: No  Does patient have a history of HHC?: No  Does patient currently have [facility]?: No    Patient Information Continued  Does patient have prescription coverage?: Yes  Within the past 12 months, you worried that your food would run out before you got the money to buy more.: Never true  Within the past 12 months, the food you bought just didn't last and you didn't have money to get more.: Never true  Food insecurity resource given?: N/A  Does patient receive dialysis treatments?: No  Does patient have a history of substance abuse?: No  Does patient have a history of Mental Health Diagnosis?: No    PHQ 2/9 Screening   Reviewed PHQ 2/9 Depression Screening Score?: No    Means of Transportation  Means of Transport to Appts[de-identified] Drives Self  In the past 12 months, has lack of transportation kept you from medical appointments or from getting medications?: No  In the past 12 months, has lack of transportation kept you from meetings, work, or from getting things needed for daily living?: No  Was application for public transport provided?: N/A        DISCHARGE DETAILS:    Discharge planning discussed with[de-identified] Patient at bedside. Freedom of Choice: Yes  Comments - Freedom of Choice: FOC maintained - CM introduced self and role. Patient denies any CM needs. No recs or referrals indicated. Spouse will transport home. Patient and CM discussed purchasing a bp machine, patient plans to pick one up from local pharmacy.   CM contacted family/caregiver?: No- see comments (Declined)  Were Treatment Team discharge recommendations reviewed with patient/caregiver?: Yes (As they pertain to d/c planning and CM role.)  Did patient/caregiver verbalize understanding of patient care needs?: Yes (As they pertain to d/c planning and CM role.)  Were patient/caregiver advised of the risks associated with not following Treatment Team discharge recommendations?: Yes (As they pertain to d/c planning and CM role.)    [address]         Is the patient interested in [facility] at discharge?: No    DME Referral Provided  Referral made for DME?: No    Other Referral/Resources/Interventions Provided:  Interventions: None Indicated    Would you like to participate in our [address] service program?  : No - Declined    Treatment Team Recommendation: Home  Discharge Destination Plan[de-identified] Home  Transport at Discharge : Self  Transfer Mode: Self  Accompanied by: Family member

## 2023-11-01 NOTE — ASSESSMENT & PLAN NOTE
Presents c/o left facial numbness which began last night, appeared resolved upon awaking this morning but shortly thereafter noticed return of left facial numbness with associated left upper and lower extremity numbness and weakness and facial droop. [address] (10/31): No acute infarction or other intracranial abnormality. Low-lying cerebellar tonsils without meeting criteria for Chiari I malformation   CTA head/neck (10/31): limited due to contrast timing; no large vessel intracranial occlusion or hemodynamically significant stenosis. No extracranial carotid stenosis. The cervical vertebral arteries are patent  MRI brain (10/31): Subcentimeter subtle focus of abnormal signal within the right anterior thalamus on series 3 image 12 of diffusion weighted imaging described in detail above suspicious for small early lacunar infarction.      Stroke pathway initiated,  No tPA/TNK given due to initial onset >4.5 hrs ago, NIHSS 2  Neuro consult appreciated,  PT, OT, SLP consults   Neurochecks per protocol  Telemetry monitor   Continue aspirin/plavix fro 21 days then monotherapy, continue statin  A1c 6.5%,   Stat CTH for any acute neuro changes  Lovenox for DVT prophylaxis

## 2023-11-01 NOTE — ASSESSMENT & PLAN NOTE
Presents with significantly elevated /135, in setting of non adherence to antihypertensives associated with left sided weakness/numbness and facial droop    Due to concern for TIA/CVA, allow for permissive hypertension for 24 hrs  Monitor vitals per stroke protocol  IV hydralazine prn persistent elevations >621 systolic

## 2023-11-01 NOTE — ASSESSMENT & PLAN NOTE
Noted history of questionable low cortisol level; labs were checked in the afternoon when this was questioned   Most recent cortisol 14 in Feb '23  AM cortisol normal

## 2023-11-01 NOTE — UTILIZATION REVIEW
Initial Clinical Review    Admission: Date/Time/Statement:   Admission Orders (From admission, onward)       Ordered        10/31/23 0943  Place in Observation  Once                          Orders Placed This Encounter   Procedures    Place in Observation     Standing Status:   Standing     Number of Occurrences:   1     Order Specific Question:   Level of Care     Answer:   Med Surg [16]     ED Arrival Information       Expected   -    Arrival   10/31/2023 07:55    Acuity   Emergent              Means of arrival   Wheelchair    Escorted by   Family Member    Service   Hospitalist    Admission type   Emergency              Arrival complaint   DIZZINESS/FACIAL NUMBNESS             Chief Complaint   Patient presents with    Facial Numbness     Started with left sided facial numbness last night which now has progressed to entire face and left arm and leg weakness and trouble walking, pt non compliant with htn meds        Initial Presentation: 60 y.o. female past medical history of diabetes, hypertension, nondominant to medications, history of adrenal insufficiency, to emergency room with Family member reports symptoms of left facial and left arm numbness as well as gait instability. Last well-known was around 10:30 PM last night. This morning shortly after she woke up she had similar symptoms; Currently patient reports her left-sided facial and left upper EXTR symptoms have completely resolved. Currently denies any weakness. Does report having some minimal numbness to left lower extremity   EXAM    Initial CT head reported negative for acute abnormality. NIH score was 2 on presentation. Patient was not deemed candidate for TNK due to low score w  resolved  / improving symptomology. MRI reveals Subcentimeter subtle focus of abnormal signal within the right anterior thalamus on series 3 image 12 of diffusion weighted imaging described in detail above suspicious for small early lacunar infarction.    Observation admission due to TIA / acute stroke     Consult Neuro. Tele, echo; neuro checks; MRI brain, PT/OT/Speech consult; ASA/ Plavix load  Neuro   MRI Brain w subacute punctate R thalamic diffusion defect;   S/p aspirin 324 mg and Plavix 300 mg x 1, start aspirin 81 mg and Plavix 75 mg tomorrow AM  Atorvastatin 40 mg  Permissive HTN, labetalol if SBP >200 x 24 hours from symptom onset, then goal normontension and avoid hypotension. Continue telemetry  PT/OT/ST  Frequent neuro checks. Continue to monitor and notify neurology with any changes. Date: 11/1/2023  Day 2:     ED Triage Vitals   Temperature Pulse Respirations Blood Pressure SpO2   10/31/23 0802 10/31/23 0802 10/31/23 0802 10/31/23 0802 10/31/23 0802   98.3 °F (36.8 °C) 94 20 (!) 226/135 96 %      Temp Source Heart Rate Source Patient Position - Orthostatic VS BP Location FiO2 (%)   10/31/23 1600 10/31/23 0802 10/31/23 0930 10/31/23 1000 --   Oral Monitor Sitting Right arm       Pain Score       10/31/23 1442       No Pain          Wt Readings from Last 1 Encounters:   10/31/23 129 kg (284 lb 6.3 oz)     Additional Vital Signs:   Date/Time Temp Pulse Resp BP MAP (mmHg) SpO2 O2 Device Patient Position - Orthostatic VS   11/01/23 0700 98 °F (36.7 °C)  70  18  139/70  93 97 %  None (Room air) Lying   Temp: Simultaneous filing. User may not have seen previous data. at 11/01/23 0700   Pulse: Simultaneous filing. User may not have seen previous data. at 11/01/23 0700   Resp: Simultaneous filing. User may not have seen previous data. at 11/01/23 0700   BP: Simultaneous filing. User may not have seen previous data. at 11/01/23 0700   SpO2: Simultaneous filing. User may not have seen previous data.  at 11/01/23 0700   11/01/23 0300 -- 71 18 120/70 -- 97 % None (Room air) Lying   10/31/23 2300 98.1 °F (36.7 °C) 77 18 109/74 86 94 % None (Room air) Lying   10/31/23 2200 98.1 °F (36.7 °C) 77 18 109/74 86 94 % -- --   10/31/23 2000 -- 85 18 127/60 82 96 % None (Room air) -- 10/31/23 1900 98.6 °F (37 °C) 74 16 115/64 81 95 % None (Room air) Lying   10/31/23 1600 99 °F (37.2 °C) 77 20 149/80 103 94 % None (Room air) Lying   10/31/23 1400 -- 81 26 Abnormal  139/79 103 95 % None (Room air) Sitting   10/31/23 1300 -- 75 25 Abnormal  139/79 103 93 % None (Room air) Sitting   10/31/23 1200 -- 76 22 130/58 87 95 % None (Room air) Sitting   10/31/23 1100 -- 76 23 Abnormal  178/85 Abnormal  122 96 % -- Sitting   10/31/23 1048 -- 75 18 151/78 105 96 % None (Room air) Sitting   10/31/23 1000 -- 77 18 160/74 106 97 % None (Room air) Sitting   10/31/23 0945 -- 75 17 167/74 -- 97 % None (Room air) Sitting   10/31/23 0930 -- 78 18 164/69 -- 96 % None (Room air) Sitting   10/31/23 0915 -- 73 18 166/72 -- 96 % None (Room air) --   10/31/23 0900 -- 80 20 196/85 Abnormal  122 96 % None (Room air) --   10/31/23 0845 -- 82 18 185/76 Abnormal  -- 99 % None (Room air) --   10/31/23 0830 -- 90 18 195/86 Abnormal  -- 99 % None (Room air) --   10/31/23 0815 -- 79 18 198/73 Abnormal  -- 99 % None (Room air) --   10/31/23 0808 -- -- -- 167/79 -- -- -- --   10/31/23 0802 98.3 °F (36.8 °C) 94 20 226/135 Abnormal  -- 96 % None (Room air) --     Weights (last 14 days)    Date/Time Weight Weight Method Height   10/31/23 1000 -- -- 5' (1.524 m)   10/31/23 0808 129 kg (284 lb 6.3 oz) Bed scale --     Pertinent Labs/Diagnostic Test Results:   10/31 lore=  ST segments:     ST segments:  Normal   T waves:     T waves: non-specific     10/31 echo=    MRI brain wo contrast   Final Result by [id number] (10/31 1212)      Subcentimeter subtle focus of abnormal signal within the right anterior thalamus on series 3 image 12 of diffusion weighted imaging described in detail above suspicious for small early lacunar infarction. This examination was marked "immediate notification" in Epic in order to begin the standard process by which the radiology reading room liaison alerts the referring practitioner. Workstation performed: [id number]         XR stroke alert portable chest   Final Result by Mallory Sow MD (10/31 1111)      No focal consolidation, pleural effusion, or pneumothorax. Workstation performed: [id number]         CTA stroke alert (head/neck)   Final Result by Papito Poole MD (10/31 0903)   The examination is limited due to contrast timing. 1. CTA head: Negative for large vessel intracranial occlusion or hemodynamically significant stenosis. 2. CTA neck:  No extracranial carotid stenosis. The cervical vertebral arteries are patent. Findings were directly discussed with Dr. Shelley Madden at 9:00 a.m. Workstation performed: [id number]         CT stroke alert brain   Final Result by Papito Poole MD (10/31 0909)      No acute infarction or other intracranial abnormality. Low-lying cerebellar tonsils without meeting criteria for Chiari I malformation. Findings were directly discussed with Dr. Shelley Madden at 9:00 a.m. Resident: Len Frankel, the attending radiologist, have reviewed the images and agree with the final report above.       Workstation performed: [id number]           Results from last 7 days   Lab Units 10/31/23  0809   SARS-COV-2  Negative     Results from last 7 days   Lab Units 11/01/23  0528 10/31/23  0809   WBC Thousand/uL 6.09 7.56   HEMOGLOBIN g/dL 13.5 14.2   HEMATOCRIT % 40.8 43.1   PLATELETS Thousands/uL 230 257         Results from last 7 days   Lab Units 11/01/23  0528 10/31/23  0809   SODIUM mmol/L 137 137   POTASSIUM mmol/L 3.6 3.5   CHLORIDE mmol/L 105 103   CO2 mmol/L 25 27   ANION GAP mmol/L 7 7   BUN mg/dL 13 14   CREATININE mg/dL 0.69 0.85   EGFR ml/min/1.73sq m 95 75   CALCIUM mg/dL 8.9 9.1   MAGNESIUM mg/dL 2.1  --      Results from last 7 days   Lab Units 11/01/23 0528   AST U/L 14   ALT U/L 17   ALK PHOS U/L 57   TOTAL PROTEIN g/dL 7.0   ALBUMIN g/dL 3.7   TOTAL BILIRUBIN mg/dL 0.52     Results from last 7 days   Lab Units 11/01/23  0807 10/31/23  2016 10/31/23  1619 10/31/23  1100 10/31/23  0807   POC GLUCOSE mg/dl 120 103 91 111 195*     Results from last 7 days   Lab Units 11/01/23  0528 10/31/23  0809   GLUCOSE RANDOM mg/dL 130 206*         Results from last 7 days   Lab Units 11/01/23  0528   HEMOGLOBIN A1C % 6.5*   EAG mg/dl 140     No results found for: "BETA-HYDROXYBUTYRATE"                   Results from last 7 days   Lab Units 10/31/23  1216 10/31/23  1104 10/31/23  0809   HS TNI 0HR ng/L  --   --  4   HS TNI 2HR ng/L 6  --   --    HSTNI D2 ng/L 2  --   --    HS TNI 4HR ng/L  --  5  --    HSTNI D4 ng/L  --  1  --          Results from last 7 days   Lab Units 10/31/23  0809   PROTIME seconds 13.9   INR  1.01   PTT seconds 29                                                                 Results from last 7 days   Lab Units 10/31/23  0809   INFLUENZA A PCR  Negative   INFLUENZA B PCR  Negative   RSV PCR  Negative                                               ED Treatment:   Medication Administration from 10/31/2023 0754 to 10/31/2023 1502         Date/Time Order Dose Route Action Action by Comments     10/31/2023 0823 EDT iohexol (OMNIPAQUE) 350 MG/ML injection (MULTI-DOSE) 100 mL 100 mL Intravenous Given Jean Marie Lyon --     10/31/2023 0900 EDT aspirin chewable tablet 324 mg 324 mg Oral Given Ho Costa RN --     10/31/2023 0900 EDT clopidogrel (PLAVIX) tablet 300 mg 300 mg Oral Given Ho Costa RN --     10/31/2023 1115 EDT insulin lispro (HumaLOG) 100 units/mL subcutaneous injection 2-12 Units -- Subcutaneous Not Given Marianna Garcia --     10/31/2023 1123 EDT LORazepam (ATIVAN) injection 1 mg 1 mg Intravenous Given Marianna Garcia --          Past Medical History:   Diagnosis Date    Diabetes type 2, controlled ([address])     Hypertension     No official diagnosis    Obesity      Present on Admission:   Abnormal cortisol level   Hypertensive urgency   Left facial numbness   Type 2 diabetes mellitus with other specified complication (HCC)   Stroke (cerebrum) (HCC)      Admitting Diagnosis: Numbness [R20.0]  Dizziness [R42]  Facial numbness [R20.0]  Age/Sex: 60 y.o. female  Admission Orders:  Telemetry  Neuro checks freq  Vitals freq  Nursing dysphagia assessment prior to diet  NPO  Assess NIH stroke scale on admission & DC  PT/OT/SPEECH assess & treat  MRI  CT scan w neuro changes     Scheduled Medications:  aspirin, 81 mg, Oral, Daily  atorvastatin, 40 mg, Oral, QPM  clopidogrel, 75 mg, Oral, Daily  enoxaparin, 60 mg, Subcutaneous, Q12H GISELE  insulin lispro, 2-12 Units, Subcutaneous, TID AC  insulin lispro, 2-12 Units, Subcutaneous, HS      Continuous IV Infusions:     PRN Meds:  acetaminophen, 650 mg, Oral, Q6H PRN  aluminum-magnesium hydroxide-simethicone, 30 mL, Oral, Q6H PRN  ondansetron, 4 mg, Intravenous, Q6H PRN  polyethylene glycol, 17 g, Oral, Daily PRN        IP CONSULT TO NEUROLOGY  IP CONSULT TO PHYSICAL MEDICINE REHAB  IP CONSULT TO CASE MANAGEMENT  IP CONSULT TO NUTRITION SERVICES    Network Utilization Review Department  ATTENTION: Please call with any questions or concerns to [phone] and carefully listen to the prompts so that you are directed to the right person. All voicemails are confidential.   For Discharge needs, contact Care Management DC Support Team at [phone] opt. 2  Send all requests for admission clinical reviews, approved or denied determinations and any other requests to dedicated fax number below belonging to the campus where the patient is receiving treatment.  List of dedicated fax numbers for the Facilities:  [place] DENIALS (Administrative/Medical Necessity) [phone]   DISCHARGE SUPPORT TEAM (NETWORK) [id number] Jelani Delatorre (Maternity/NICU/Pediatrics) [address] [place] [address] [address] [address] [facility] [address] [phone]

## 2023-11-01 NOTE — ASSESSMENT & PLAN NOTE
Recent Labs     10/31/23  1100 10/31/23  1619 10/31/23  2016 11/01/23  0807   POCGLU 111 91 103 120         Blood Sugar Average: Last 72 hrs:  (P) 124  A1c 6.5%  Hold GLP-1, recently started by endocrinologist but unfortunately not covered by insurance   Continue SSI # 4 (weight based dose)  Diabetic diet level 3  Blood glucose monitoring ACHS  Hypoglycemia protocol  Adjust as needed

## 2023-11-01 NOTE — ASSESSMENT & PLAN NOTE
Body mass index is 55.54 kg/m².     Recommend incorporating a more whole foods plant-predominant diet along with decreasing consumption of red meats and processed foods  Per AHA guidelines, recommend moderate-vigorous intensity exercise for 30 minutes a day for 5 days a week or a total of 150 min/week

## 2023-11-03 ENCOUNTER — TELEPHONE (OUTPATIENT)
Dept: NEUROLOGY | Facility: CLINIC | Age: 58
End: 2023-11-03

## 2023-11-03 NOTE — TELEPHONE ENCOUNTER
12-26-23/Ethan/ [place] office/12 pm       Past the 6 week follow up instruction     Patient only wanting to be seen in [place] area

## 2023-11-03 NOTE — TELEPHONE ENCOUNTER
Ema Brown will need follow up in in 6 weeks with neurovascular attending/AP . She will not require outpatient neurological testing.

## 2023-11-03 NOTE — TELEPHONE ENCOUNTER
HFU/SLM/Stroke /DC date 11-1-23 to Home self care       Neurology consult note:  Outpatient neurology follow-up will be recommended in 4 to 6 weeks.        Sent a message to Dr. Yodit Gann for clarification on scheduling     Patient did ask to stay in the [facility] area for the follow up     Awaiting a response to schedule   Informed the patient that we will call to schedule when we receive a response     Patient expressed understanding and will await our CB

## 2023-12-20 ENCOUNTER — TELEPHONE (OUTPATIENT)
Dept: NEUROLOGY | Facility: CLINIC | Age: 58
End: 2023-12-20

## 2023-12-20 NOTE — TELEPHONE ENCOUNTER
Called patient left voicemail to confirm appointment with  on 12/26/23 at [facility].

## 2023-12-26 ENCOUNTER — OFFICE VISIT (OUTPATIENT)
Dept: NEUROLOGY | Facility: CLINIC | Age: 58
End: 2023-12-26
Payer: COMMERCIAL

## 2023-12-26 VITALS
WEIGHT: 274.2 LBS | SYSTOLIC BLOOD PRESSURE: 130 MMHG | HEIGHT: 60 IN | OXYGEN SATURATION: 97 % | DIASTOLIC BLOOD PRESSURE: 80 MMHG | HEART RATE: 75 BPM | BODY MASS INDEX: 53.83 KG/M2

## 2023-12-26 DIAGNOSIS — I63.9 CEREBROVASCULAR ACCIDENT (CVA), UNSPECIFIED MECHANISM (HCC): Primary | ICD-10-CM

## 2023-12-26 PROCEDURE — 99205 OFFICE O/P NEW HI 60 MIN: CPT | Performed by: PSYCHIATRY & NEUROLOGY

## 2023-12-26 RX ORDER — ATORVASTATIN CALCIUM 40 MG/1
40 TABLET, FILM COATED ORAL EVERY EVENING
Qty: 30 TABLET | Refills: 6 | Status: SHIPPED | OUTPATIENT
Start: 2023-12-26

## 2023-12-26 NOTE — PROGRESS NOTES
NEUROLOGY OUTPATIENT - NEW PATIENT VISIT NOTE        NAME: Anjali Casas  MRN: [id number]  : 1965     TODAY'S DATE: 23         HISTORY OF PRESENT ILLNESS (HPI):    Ms. Casas is a 58 y.o. female presenting with Facial Numbness and Stroke       Patient has a past medical history of diabetes, hypertension, obesity    10/31/2023-presented with left facial numbness and left-sided weakness along with gait instability and dizziness.  NIH stroke scale was 2 but the blood pressure at presentation was 220 systolic.  She was not a TNK candidate secondary to her being outside the TNK window.  Underwent MRI of the brain which was showing right thalamic infarct likely etiology was deemed to be small vessel.  CT head was showing low-lying cerebellar tonsils while the CT head and neck was negative for any large vessel occlusion.  Transthoracic echo was showing a preserved EF with mildly abnormal diastolic dysfunction the LDL was 114 and hemoglobin A1c was 6.5.  She was started on aspirin and Plavix for 21 days and then aspirin monotherapy.  She was also started on Lipitor 40 mg.    Since her discharge she reports that she is feeling well, no numbness, no weakness, no problem with her walking. She has been taking Aspirin 81 mg and no side effects reported. She has stopped using the Lipitor and Plavix. She stopped using the Lipitor due to the side effects.       Modified Gary Scale     0 0 No Sxs    1 Sxs no disability    2 Sxs slight disability    3 Mod disability can walk independently    4 Mod disability walks withassistance    5 Severe disability bedridden    6 Dead               CURRENT OUTPATIENT MEDICATIONS:    Anjali Casas has a current medication list which includes the following prescription(s): aspirin, betamethasone valerate, calcium-magnesium, cyanocobalamin, fluticasone, garlic, lisinopril, loratadine, multivitamin, neomycin-polymyxin-dexamethasone, atorvastatin, clopidogrel, and  "tirzepatide.       PHYSICAL EXAMINATION:   VITAL SIGNS:  height is 5' (1.524 m) and weight is 124 kg (274 lb 3.2 oz). Her blood pressure is 130/80 and her pulse is 75. Her oxygen saturation is 97%.        NEUROLOGICAL EXAMINATION:    MENTAL STATUS EXAM: Alert, oriented to time place and person     CRANIAL NERVE EXAMINATION:  I Not tested   II Normal visual fields to finger counting.   II, Ill,  IV, VI Pupils were symmetric, briskly reactive. No afferent pupillary defect.  Eye movements are full without nystagmus. No ptosis.   V Facial sensation were intact   VII Facial movements were symmetrical    VIII Hearing was intact   IX, X Intact   XI Shoulder shrug and head turn was normal   XII Tongue protrusion is in the mid line.          MOTOR EXAM:        Arm Right  Left Leg Right  Left   Deltoid 5/5 5/5 lliopsoas 5/5 5/5   Biceps 5/5 5/5 Quads 5/5 5/5   Triceps 5/5 5/5 Hamstrings 5/5 5/5   Wrist Extension 5/5 5/5 Ankle Dorsi Flexion 5/5 5/5   Wrist Flexion 5/5 5/5 Ankle Plantar Flexion 5/5 5/5               DEEP TENDON REFLEXES:     Brachioradialis Biceps Triceps Patellar Achilles   Right 1+ 1+ 1+ 1+ 1+   Left 1+ 1+ 1+ 1+ 1+            SENSORY EXAMINATION:   Sensation intact     COORDINATION:   normal finger to nose testing     GAIT/STATION:   normal        DIAGNOSTIC STUDIES:   PERTINENT LABS:     Lab Results   Component Value Date    WBC 6.09 11/01/2023     Lab Results   Component Value Date    HGB 13.5 11/01/2023     Lab Results   Component Value Date     11/01/2023       No results found for: \"LABGLUC\"  Lab Results   Component Value Date    CREATININE 0.69 11/01/2023     Lab Results   Component Value Date    AST 14 11/01/2023     Lab Results   Component Value Date    ALT 17 11/01/2023     Lab Results   Component Value Date    ALKPHOS 57 11/01/2023     Lab Results   Component Value Date    AST 14 11/01/2023        HbA1C 6.5      NEUROIMAGING:  Results for orders placed during the hospital encounter of " "10/31/23    MRI brain wo contrast    Impression  Subcentimeter subtle focus of abnormal signal within the right anterior thalamus on series 3 image 12 of diffusion weighted imaging described in detail above suspicious for small early lacunar infarction.    This examination was marked \"immediate notification\" in Epic in order to begin the standard process by which the radiology reading room liaison alerts the referring practitioner.    Workstation performed: [id number]     - CT head:  1. No acute infarction or other intracranial abnormality.  2. Low-lying cerebellar tonsils without meeting criteria for Chiari I malformation.  - CTA head and neck:  1. CTA head: Negative for large vessel intracranial occlusion or hemodynamically significant stenosis.  2. CTA neck:  No extracranial carotid stenosis.  The cervical vertebral arteries are patent.              TTE 11/1/2023      Left Ventricle: Left ventricular cavity size is normal. Wall thickness is normal. The left ventricular ejection fraction is 60%. Systolic function is normal. Wall motion is normal. Diastolic function is mildly abnormal, consistent with grade I (abnormal) relaxation.    Right Ventricle: Right ventricular cavity size is normal. Systolic function is normal.       ASSESSMENT AND PLAN:    Ms. Casas is a 58 y.o.female  presenting with stroke follow-up. Patient  has a past medical history of Diabetes type 2, controlled ([facility]), Hypertension, and Obesity.. Neurological exam is nonfocal. Neuroimaging including MRI of the brain was showing lacunar infarct in the right thalamic region.  Likely etiology of the stroke seems small vessel secondary to her elevated blood pressure and hyperlipidemia.         1. Cerebrovascular accident (CVA) like lacunar stroke:   - For secondary stroke risk prevention, continue with Aspirin 81 mg daily.   - For hyperlipidemia continue with Lipitor 40 mg daily.  Discussed compliance issues with the patient and told her that the " Lipitor is going to help prevent plaque buildup in her blood vessels.  It is okay to take the supplements but they are not replacement for Lipitor.  If she is concerned about side effects then she can discuss with her primary care physician about maybe switching to a different hyperlipidemic agent  - Blood pressure control   - I counseled at length about healthy life style modifications including heart health diet, regular exercise and compliance with his medications and medical appointment.          Return if symptoms worsen or fail to improve.       The management plan was discussed in detail with the patient and all questions were answered.     Ms. Casas was encouraged to contact our office with any questions or concerns and to contact the clinic or go to the nearest emergency room if symptoms change or worsen.       I have spent a total of 61 min in reviewing and/or ordering tests, medications, or procedures, performing an examination or evaluation, reviewing pertinent history, counseling and educating the patient, referring and/or communicating with other health care professionals, documenting in the EMR and general coordination of care of Ms. Casas  today.           Janis Long MD.   Staff Neurologist,   Neuroimmunology and Neuroinfectious disease  12/26/23     This report has been created through the use of voice recognition/text compilation software.  Typographical and content errors may occur with this process.  While efforts are made to detect and correct such errors, in some cases errors will persist.  For this reason, wording in this document should be considered in the proper context and not strictly verbatim.  If, when reviewing the document, an error is discovered, please let the office know at [phone]

## 2024-02-21 PROBLEM — Z01.419 ENCOUNTER FOR ANNUAL ROUTINE GYNECOLOGICAL EXAMINATION: Status: RESOLVED | Noted: 2022-11-08 | Resolved: 2024-02-21

## 2025-05-21 ENCOUNTER — OFFICE VISIT (OUTPATIENT)
Age: 60
End: 2025-05-21
Payer: COMMERCIAL

## 2025-05-21 VITALS
BODY MASS INDEX: 53.71 KG/M2 | OXYGEN SATURATION: 99 % | RESPIRATION RATE: 18 BRPM | HEIGHT: 60 IN | TEMPERATURE: 97.8 F | SYSTOLIC BLOOD PRESSURE: 144 MMHG | DIASTOLIC BLOOD PRESSURE: 98 MMHG | WEIGHT: 273.6 LBS | HEART RATE: 72 BPM

## 2025-05-21 DIAGNOSIS — Z11.59 NEED FOR HEPATITIS C SCREENING TEST: ICD-10-CM

## 2025-05-21 DIAGNOSIS — Z12.31 ENCOUNTER FOR SCREENING MAMMOGRAM FOR BREAST CANCER: ICD-10-CM

## 2025-05-21 DIAGNOSIS — Z86.19 HISTORY OF HELICOBACTER PYLORI INFECTION: ICD-10-CM

## 2025-05-21 DIAGNOSIS — Z00.00 ANNUAL PHYSICAL EXAM: Primary | ICD-10-CM

## 2025-05-21 DIAGNOSIS — Z11.4 SCREENING FOR HIV (HUMAN IMMUNODEFICIENCY VIRUS): ICD-10-CM

## 2025-05-21 DIAGNOSIS — E55.9 VITAMIN D DEFICIENCY: ICD-10-CM

## 2025-05-21 DIAGNOSIS — I10 PRIMARY HYPERTENSION: ICD-10-CM

## 2025-05-21 DIAGNOSIS — Z91.148 NONADHERENCE TO MEDICATION: ICD-10-CM

## 2025-05-21 DIAGNOSIS — E66.01 MORBID OBESITY WITH BMI OF 50.0-59.9, ADULT (HCC): ICD-10-CM

## 2025-05-21 DIAGNOSIS — E78.2 MIXED HYPERLIPIDEMIA: ICD-10-CM

## 2025-05-21 DIAGNOSIS — I63.9 CEREBROVASCULAR ACCIDENT (CVA), UNSPECIFIED MECHANISM (HCC): ICD-10-CM

## 2025-05-21 DIAGNOSIS — E11.69 TYPE 2 DIABETES MELLITUS WITH OTHER SPECIFIED COMPLICATION, WITHOUT LONG-TERM CURRENT USE OF INSULIN (HCC): ICD-10-CM

## 2025-05-21 DIAGNOSIS — R79.89 ABNORMAL CORTISOL LEVEL: ICD-10-CM

## 2025-05-21 PROBLEM — I16.1 HYPERTENSIVE EMERGENCY: Status: RESOLVED | Noted: 2017-11-28 | Resolved: 2025-05-21

## 2025-05-21 LAB
LEFT EYE DIABETIC RETINOPATHY: ABNORMAL
LEFT EYE IMAGE QUALITY: ABNORMAL
LEFT EYE MACULAR EDEMA: ABNORMAL
LEFT EYE OTHER RETINOPATHY: ABNORMAL
RIGHT EYE DIABETIC RETINOPATHY: ABNORMAL
RIGHT EYE IMAGE QUALITY: ABNORMAL
RIGHT EYE MACULAR EDEMA: ABNORMAL
RIGHT EYE OTHER RETINOPATHY: ABNORMAL
SEVERITY (EYE EXAM): ABNORMAL

## 2025-05-21 PROCEDURE — 99386 PREV VISIT NEW AGE 40-64: CPT | Performed by: STUDENT IN AN ORGANIZED HEALTH CARE EDUCATION/TRAINING PROGRAM

## 2025-05-21 PROCEDURE — 99214 OFFICE O/P EST MOD 30 MIN: CPT | Performed by: STUDENT IN AN ORGANIZED HEALTH CARE EDUCATION/TRAINING PROGRAM

## 2025-05-21 PROCEDURE — 92250 FUNDUS PHOTOGRAPHY W/I&R: CPT | Performed by: STUDENT IN AN ORGANIZED HEALTH CARE EDUCATION/TRAINING PROGRAM

## 2025-05-21 RX ORDER — LISINOPRIL 5 MG/1
5 TABLET ORAL DAILY
Qty: 90 TABLET | Refills: 1 | Status: SHIPPED | OUTPATIENT
Start: 2025-05-21 | End: 2025-08-19

## 2025-05-21 RX ORDER — ATORVASTATIN CALCIUM 40 MG/1
40 TABLET, FILM COATED ORAL EVERY EVENING
Qty: 90 TABLET | Refills: 1 | Status: SHIPPED | OUTPATIENT
Start: 2025-05-21

## 2025-05-21 NOTE — PATIENT INSTRUCTIONS
"Patient Education     Routine physical for adults   The Basics   Written by the doctors and editors at [facility]   What is a physical? -- A physical is a routine visit, or \"check-up,\" with your doctor. You might also hear it called a \"wellness visit\" or \"preventive visit.\"  During each visit, the doctor will:   Ask about your physical and mental health   Ask about your habits, behaviors, and lifestyle   Do an exam   Give you vaccines if needed   Talk to you about any medicines you take   Give advice about your health   Answer your questions  Getting regular check-ups is an important part of taking care of your health. It can help your doctor find and treat any problems you have. But it's also important for preventing health problems.  A routine physical is different from a \"sick visit.\" A sick visit is when you see a doctor because of a health concern or problem. Since physicals are scheduled ahead of time, you can think about what you want to ask the doctor.  How often should I get a physical? -- It depends on your age and health. In general, for people age 21 years and older:   If you are younger than 50 years, you might be able to get a physical every 3 years.   If you are 50 years or older, your doctor might recommend a physical every year.  If you have an ongoing health condition, like diabetes or high blood pressure, your doctor will probably want to see you more often.  What happens during a physical? -- In general, each visit will include:   Physical exam - The doctor or nurse will check your height, weight, heart rate, and blood pressure. They will also look at your eyes and ears. They will ask about how you are feeling and whether you have any symptoms that bother you.   Medicines - It's a good idea to bring a list of all the medicines you take to each doctor visit. Your doctor will talk to you about your medicines and answer any questions. Tell them if you are having any side effects that bother you. You " "should also tell them if you are having trouble paying for any of your medicines.   Habits and behaviors - This includes:   Your diet   Your exercise habits   Whether you smoke, drink alcohol, or use drugs   Whether you are sexually active   Whether you feel safe at home  Your doctor will talk to you about things you can do to improve your health and lower your risk of health problems. They will also offer help and support. For example, if you want to quit smoking, they can give you advice and might prescribe medicines. If you want to improve your diet or get more physical activity, they can help you with this, too.   Lab tests, if needed - The tests you get will depend on your age and situation. For example, your doctor might want to check your:   Cholesterol   Blood sugar   Iron level   Vaccines - The recommended vaccines will depend on your age, health, and what vaccines you already had. Vaccines are very important because they can prevent certain serious or deadly infections.   Discussion of screening - \"Screening\" means checking for diseases or other health problems before they cause symptoms. Your doctor can recommend screening based on your age, risk, and preferences. This might include tests to check for:   Cancer, such as breast, prostate, cervical, ovarian, colorectal, prostate, lung, or skin cancer   Sexually transmitted infections, such as chlamydia and gonorrhea   Mental health conditions like depression and anxiety  Your doctor will talk to you about the different types of screening tests. They can help you decide which screenings to have. They can also explain what the results might mean.   Answering questions - The physical is a good time to ask the doctor or nurse questions about your health. If needed, they can refer you to other doctors or specialists, too.  Adults older than 65 years often need other care, too. As you get older, your doctor will talk to you about:   How to prevent falling at " home   Hearing or vision tests   Memory testing   How to take your medicines safely   Making sure that you have the help and support you need at home  All topics are updated as new evidence becomes available and our peer review process is complete.  This topic retrieved from [employer] on: May 02, 2024.  Topic 701718 Version 1.0  Release: 32.4.3 - C32.122  © 2024 UpToDate, Inc. and/or its affiliates. All rights reserved.  Consumer Information Use and Disclaimer   Disclaimer: This generalized information is a limited summary of diagnosis, treatment, and/or medication information. It is not meant to be comprehensive and should be used as a tool to help the user understand and/or assess potential diagnostic and treatment options. It does NOT include all information about conditions, treatments, medications, side effects, or risks that may apply to a specific patient. It is not intended to be medical advice or a substitute for the medical advice, diagnosis, or treatment of a health care provider based on the health care provider's examination and assessment of a patient's specific and unique circumstances. Patients must speak with a health care provider for complete information about their health, medical questions, and treatment options, including any risks or benefits regarding use of medications. This information does not endorse any treatments or medications as safe, effective, or approved for treating a specific patient. UpToDate, Inc. and its affiliates disclaim any warranty or liability relating to this information or the use thereof.The use of this information is governed by the Terms of Use, available at https://www.[employer].com/en/know/clinical-effectiveness-terms. 2024© UpToDate, Inc. and its affiliates and/or licensors. All rights reserved.  Copyright   © 2024 UpToDate, Inc. and/or its affiliates. All rights reserved.

## 2025-05-21 NOTE — ASSESSMENT & PLAN NOTE
on labs done last August. She has not been taking atorvastatin. She has multiple indications to be on statin including T2DM and CVA.  Restart atorvastatin 40 mg qd  Recheck lipid panel    Orders:    Lipid panel; Future    TSH, 3rd generation with Free T4 reflex; Future

## 2025-05-21 NOTE — ASSESSMENT & PLAN NOTE
BP elevated today. She was previously on lisinopril 5 mg qd but has not been taking this. She reports that she only ever takes this as needed if her BP is very high. She reports usually SBP in the 130s-140s at home.  She is agreeable to restarting lisinopril 5 mg qd at this time but would prefer to increase to 10 mg qd if she is tolerating well; will await labs to ensure no contraindication    Orders:    CBC and differential; Future    Comprehensive metabolic panel; Future    TSH, 3rd generation with Free T4 reflex; Future    lisinopril (ZESTRIL) 5 mg tablet; Take 1 tablet (5 mg total) by mouth daily

## 2025-05-21 NOTE — ASSESSMENT & PLAN NOTE
Reviewed MRI brain from 2023 showing lacunar infarct.  Restart atorvastatin 40 mg qd  Advised to take ASA 81 mg qd (unclear if she has been using this daily or not)    Orders:    atorvastatin (LIPITOR) 40 mg tablet; Take 1 tablet (40 mg total) by mouth every evening

## 2025-05-21 NOTE — ASSESSMENT & PLAN NOTE
BMI Counseling: Body mass index is 53.43 kg/m². The BMI is above normal. Exercise recommendations include exercising 3-5 times per week.

## 2025-05-21 NOTE — ASSESSMENT & PLAN NOTE
Lab Results   Component Value Date    HGBA1C 6.3 (H) 08/26/2024     Long discussion with patient regarding this diagnosis. She denies history of T2DM, though looking at chart she follows with Endocrinology and this diagnosis is present in her chart since at least 2017. While her most recent A1c was 6.3, she has met diagnostic criteria for diabetes multiple times over the years in review of her previous A1c's and metabolic panels.  Recheck A1c  Check GFR, albumin/cr ratio  Diabetic foot and eye exams done today  Recommended to restart statin and lisinopril    Orders:    Comprehensive metabolic panel; Future    Hemoglobin A1C; Future    Albumin / creatinine urine ratio; Future    IRIS Diabetic eye exam

## 2025-05-21 NOTE — ASSESSMENT & PLAN NOTE
She has not been taking lisinopril or atorvastatin though she has multiple indications to be on both. She takes baby aspirin but it is unclear if she is taking this every day. Encouraged adherence to lisinopril, atorvastatin, and ASA daily.

## 2025-05-21 NOTE — PROGRESS NOTES
Adult Annual Physical  Name: Anjali Casas      : 1965      MRN: [id number]  Encounter Provider: Joseph Patel MD  Encounter Date: 2025   Encounter department: [facility] [place]    :  Assessment & Plan  Annual physical exam  Immunizations: Patient declines immunizations  Labs: CBC, CMP, A1c, lipids, TSH, vit D  Screening: Hep C, HIV, mammogram ordered; UTD on cervical cancer screening and colonoscopy        Type 2 diabetes mellitus with other specified complication, without long-term current use of insulin (HCC)    Lab Results   Component Value Date    HGBA1C 6.3 (H) 2024     Long discussion with patient regarding this diagnosis. She denies history of T2DM, though looking at chart she follows with Endocrinology and this diagnosis is present in her chart since at least . While her most recent A1c was 6.3, she has met diagnostic criteria for diabetes multiple times over the years in review of her previous A1c's and metabolic panels.  Recheck A1c  Check GFR, albumin/cr ratio  Diabetic foot and eye exams done today  Recommended to restart statin and lisinopril    Orders:    Comprehensive metabolic panel; Future    Hemoglobin A1C; Future    Albumin / creatinine urine ratio; Future    IRIS Diabetic eye exam    Mixed hyperlipidemia   on labs done last August. She has not been taking atorvastatin. She has multiple indications to be on statin including T2DM and CVA.  Restart atorvastatin 40 mg qd  Recheck lipid panel    Orders:    Lipid panel; Future    TSH, 3rd generation with Free T4 reflex; Future    Cerebrovascular accident (CVA), unspecified mechanism (HCC)  Reviewed MRI brain from  showing lacunar infarct.  Restart atorvastatin 40 mg qd  Advised to take ASA 81 mg qd (unclear if she has been using this daily or not)    Orders:    atorvastatin (LIPITOR) 40 mg tablet; Take 1 tablet (40 mg total) by mouth every evening    Morbid obesity with BMI of 50.0-59.9,  adult ([facility])  BMI Counseling: Body mass index is 53.43 kg/m². The BMI is above normal. Exercise recommendations include exercising 3-5 times per week.          Nonadherence to medication  She has not been taking lisinopril or atorvastatin though she has multiple indications to be on both. She takes baby aspirin but it is unclear if she is taking this every day. Encouraged adherence to lisinopril, atorvastatin, and ASA daily.       Primary hypertension  BP elevated today. She was previously on lisinopril 5 mg qd but has not been taking this. She reports that she only ever takes this as needed if her BP is very high. She reports usually SBP in the 130s-140s at home.  She is agreeable to restarting lisinopril 5 mg qd at this time but would prefer to increase to 10 mg qd if she is tolerating well; will await labs to ensure no contraindication    Orders:    CBC and differential; Future    Comprehensive metabolic panel; Future    TSH, 3rd generation with Free T4 reflex; Future    lisinopril (ZESTRIL) 5 mg tablet; Take 1 tablet (5 mg total) by mouth daily    History of Helicobacter pylori infection  Reports history of H pylori that was treated but she reports she was never checked for clearance. This was few years ago. No results available in her chart.  Check H pylori stool test    Orders:    H. pylori antigen, stool; Future    Vitamin D deficiency  Low in the past. Recheck vit D.    Orders:    Vitamin D 25 hydroxy; Future    Need for hepatitis C screening test    Orders:    Hepatitis C antibody; Future    Screening for HIV (human immunodeficiency virus)    Orders:    HIV 1/2 AB/AG w Reflex [facility] for 2 yr old and above; Future    Encounter for screening mammogram for breast cancer    Orders:    Mammo screening bilateral w 3d and cad; Future    Abnormal cortisol level  Patient follows with Endocrinology but her appt has been postponed until November. She reports she was asked to do a two specimen salivary cortisol prior to  her next appt, which was supposed to be around this time. Advised that I can order the test but would defer any interpretation/management to her Endocrinologist, who she should forward the result to.    Orders:    SALIVARY CORTISOL, TWO SPECIMENS; Future              Preventive Screenings:  - Diabetes Screening: screening not indicated and has diabetes  - Cholesterol Screening: screening not indicated and has hyperlipidemia   - Hepatitis C screening: orders placed   - HIV screening: orders placed   - Cervical cancer screening: screening up-to-date   - Breast cancer screening: orders placed   - Colon cancer screening: screening up-to-date   - Lung cancer screening: screening not indicated     Immunizations:  - Immunizations due: Prevnar 20, Tdap and Zoster (Shingrix)  - Risks/benefits immunizations discussed    - The patient declines recommended vaccines currently despite my recommendations      Counseling/Anticipatory Guidance:    - Dental health: discussed importance of regular tooth brushing, flossing, and dental visits.       Depression Screening and Follow-up Plan: Patient was screened for depression during today's encounter. They screened negative with a PHQ-2 score of 0.          History of Present Illness     Adult Annual Physical:  Patient presents for annual physical. Anjali Casas is a 59 yo F with PMH of T2DM, HLD, HTN, CVA, who presents today for annual physical. She has not been taking her medications regularly..     Diet and Physical Activity:  - Diet/Nutrition: no special diet and consuming 3-5 servings of fruits/vegetables daily.  - Exercise: no formal exercise.    Depression Screening:  - PHQ-2 Score: 0    General Health:  - Sleep:. Sleep maintenance insomnia  - Hearing: normal hearing bilateral ears.  - Vision: most recent eye exam < 1 year ago, no vision problems and wears glasses.  - Dental: regular dental visits and brushes teeth twice daily.    /GYN Health:  - Follows with GYN: yes.   -  Menopause: postmenopausal.   - Last menstrual cycle: 1/1/2017.   - History of STDs: no  - Contraception: menopause.      Advanced Care Planning:  - Has an advanced directive?: no    - Has a durable medical POA?: no    - ACP document given to patient?: no      Review of Systems   Constitutional:  Negative for appetite change, chills and fever.   HENT:  Negative for congestion and sore throat.    Eyes:  Negative for visual disturbance.   Respiratory:  Negative for cough and shortness of breath.    Cardiovascular:  Positive for leg swelling. Negative for chest pain.   Gastrointestinal:  Negative for abdominal pain, constipation, diarrhea and nausea.   Genitourinary:  Negative for difficulty urinating and dysuria.   Musculoskeletal:  Negative for arthralgias and myalgias.   Skin:  Negative for rash.   Neurological:  Negative for dizziness, light-headedness and headaches.   Psychiatric/Behavioral:  Negative for confusion.      Medical History Reviewed by provider this encounter:  Meds  Problems     .  Past Medical History   Past Medical History[1]  Past Surgical History[2]  Family History[3]   reports that she has never smoked. She has never used smokeless tobacco. She reports current alcohol use. She reports that she does not use drugs.  Current Outpatient Medications   Medication Instructions    aspirin 81 mg, Oral, Daily    atorvastatin (LIPITOR) 40 mg, Oral, Every evening    Garlic 10 MG CAPS No dose, route, or frequency recorded.    lisinopril (ZESTRIL) 5 mg, Oral, Daily   Allergies[4]   Medications Ordered Prior to Encounter[5]   Social History[6]    Objective   /98 (BP Location: Left arm, Patient Position: Sitting, Cuff Size: Standard)   Pulse 72   Temp 97.8 °F (36.6 °C) (Tympanic)   Resp 18   Ht 5' (1.524 m)   Wt 124 kg (273 lb 9.6 oz)   SpO2 99%   BMI 53.43 kg/m²     Physical Exam  Constitutional:       General: She is not in acute distress.  HENT:      Right Ear: Tympanic membrane, ear canal and  external ear normal.      Left Ear: Tympanic membrane, ear canal and external ear normal.      Nose: Nose normal.      Mouth/Throat:      Mouth: Mucous membranes are moist.      Pharynx: Oropharynx is clear.     Eyes:      Conjunctiva/sclera: Conjunctivae normal.      Pupils: Pupils are equal, round, and reactive to light.     Neck:      Thyroid: No thyroid mass or thyroid tenderness.     Cardiovascular:      Rate and Rhythm: Normal rate and regular rhythm.      Pulses: no weak pulses.           Dorsalis pedis pulses are 2+ on the right side and 2+ on the left side.      Heart sounds: Normal heart sounds.   Pulmonary:      Effort: Pulmonary effort is normal.      Breath sounds: Normal breath sounds.   Abdominal:      General: There is no distension.      Tenderness: There is no abdominal tenderness.     Musculoskeletal:      Cervical back: Neck supple.      Right lower leg: No edema.      Left lower leg: No edema.      Comments: Trace edema in bilateral lower legs   Feet:      Right foot:      Skin integrity: No ulcer, skin breakdown, erythema, warmth, callus or dry skin.      Left foot:      Skin integrity: No ulcer, skin breakdown, erythema, warmth, callus or dry skin.     Skin:     General: Skin is warm and dry.     Neurological:      Mental Status: She is alert.      Sensory: Sensation is intact.      Motor: Motor function is intact.     Psychiatric:         Mood and Affect: Mood normal.         Speech: Speech normal.         Behavior: Behavior normal. Behavior is cooperative.           Diabetic Foot Exam    Patient's shoes and socks removed.    Right Foot/Ankle   Right Foot Inspection  Skin Exam: skin normal and skin intact. No dry skin, no warmth, no callus, no erythema, no maceration, no abnormal color, no pre-ulcer, no ulcer and no callus.     Toe Exam: ROM and strength within normal limits.     Sensory   Vibration: intact  Proprioception: intact  Monofilament testing: intact    Vascular  The right DP pulse  is 2+.     Left Foot/Ankle  Left Foot Inspection  Skin Exam: skin normal and skin intact. No dry skin, no warmth, no erythema, no maceration, normal color, no pre-ulcer, no ulcer and no callus.     Toe Exam: ROM and strength within normal limits.     Sensory   Vibration: intact  Proprioception: intact  Monofilament testing: intact    Vascular  The left DP pulse is 2+.     Assign Risk Category  No deformity present  No loss of protective sensation  No weak pulses  Risk: 0         [1]   Past Medical History:  Diagnosis Date    Diabetes type 2, controlled (HCC)     Hypertension     No official diagnosis    Hypertensive emergency 11/28/2017    No official diagnosis      Obesity    [2]   Past Surgical History:  Procedure Laterality Date    CARPAL TUNNEL RELEASE      COLONOSCOPY      CYST REMOVAL      NO PAST SURGERIES     [3]   Family History  Problem Relation Name Age of Onset    Diabetes Mother      Stroke Father      Hypertension Father      Cancer Neg Hx      Heart disease Neg Hx      Thyroid disease Neg Hx      Breast cancer Neg Hx      Ovarian cancer Neg Hx      Uterine cancer Neg Hx      Cervical cancer Neg Hx     [4] No Known Allergies  [5]   Current Outpatient Medications on File Prior to Visit   Medication Sig Dispense Refill    aspirin 81 mg chewable tablet Chew 1 tablet (81 mg total) daily Do not start before November 2, 2023.  0    Garlic 10 MG CAPS       [DISCONTINUED] atorvastatin (LIPITOR) 40 mg tablet Take 1 tablet (40 mg total) by mouth every evening (Patient not taking: Reported on 5/21/2025) 30 tablet 6    [DISCONTINUED] betamethasone valerate (VALISONE) 0.1 % cream APPLY IN LEFT EAR TWICE A DAY AS DIRECTED if needed (Patient not taking: Reported on 5/21/2025)      [DISCONTINUED] Calcium-Magnesium 300-300 MG TABS Take by mouth (Patient not taking: Reported on 5/21/2025)      [DISCONTINUED] Cyanocobalamin (B-12 PO) Take by mouth (Patient not taking: Reported on 5/21/2025)      [DISCONTINUED]  fluticasone (FLONASE) 50 mcg/act nasal spray 1 spray if needed (Patient not taking: Reported on 5/21/2025)      [DISCONTINUED] lisinopril (ZESTRIL) 5 mg tablet Take 1 tablet (5 mg total) by mouth daily (Patient not taking: Reported on 5/21/2025) 30 tablet 0    [DISCONTINUED] loratadine (CLARITIN) 10 mg tablet Take 10 mg by mouth if needed (Patient not taking: Reported on 5/21/2025)      [DISCONTINUED] multivitamin (THERAGRAN) TABS Take 1 tablet by mouth (Patient not taking: Reported on 5/21/2025)      [DISCONTINUED] neomycin-polymyxin-dexamethasone (MAXITROL) ophthalmic suspension  (Patient not taking: Reported on 5/21/2025)      [DISCONTINUED] tirzepatide 2.5 MG/0.5ML Inject 2.5 mg under the skin every 7 days (Patient not taking: Reported on 12/26/2023)       No current facility-administered medications on file prior to visit.   [6]   Social History  Tobacco Use    Smoking status: Never    Smokeless tobacco: Never   Vaping Use    Vaping status: Never Used   Substance and Sexual Activity    Alcohol use: Yes     Comment: occasional    Drug use: No    Sexual activity: Not Currently

## 2025-05-22 ENCOUNTER — RESULTS FOLLOW-UP (OUTPATIENT)
Age: 60
End: 2025-05-22

## 2025-05-22 NOTE — TELEPHONE ENCOUNTER
Left voice message asking pt to return call  Re: IRIS results/recommendation    No specific details given.

## 2025-05-22 NOTE — TELEPHONE ENCOUNTER
----- Message from Joseph Patel MD sent at 5/22/2025  7:25 AM EDT -----  Eye exam shows macular degeneration, patient does follow with Ophthalmologist - recommend continued follow up  ----- Message -----  From: Interface, Lab Results In  Sent: 5/21/2025   6:51 PM EDT  To: Joseph Patel MD

## 2025-05-28 ENCOUNTER — TELEPHONE (OUTPATIENT)
Age: 60
End: 2025-05-28

## 2025-05-28 NOTE — TELEPHONE ENCOUNTER
Patient returned call. Read her the following message:    Eye exam shows macular degeneration, patient does follow with Ophthalmologist - recommend continued follow up.    I did let the patient know to check her voicemail for her ophthalmologist's number.    Please advise, thank you.